# Patient Record
Sex: FEMALE | Race: WHITE | Employment: UNEMPLOYED | URBAN - METROPOLITAN AREA
[De-identification: names, ages, dates, MRNs, and addresses within clinical notes are randomized per-mention and may not be internally consistent; named-entity substitution may affect disease eponyms.]

---

## 2016-09-19 LAB
ANTIBODY SCREEN, EXTERNAL: NORMAL
CHLAMYDIA, EXTERNAL: NORMAL
GRBS, EXTERNAL: POSITIVE
HBSAG, EXTERNAL: NORMAL
HCT, EXTERNAL: 38.3
HGB, EXTERNAL: 12.9
HIV, EXTERNAL: NORMAL
N. GONORRHEA, EXTERNAL: NORMAL
PLATELET CNT,   EXTERNAL: 345
RPR, EXTERNAL: NORMAL
RUBELLA, EXTERNAL: NORMAL
TYPE, ABO & RH, EXTERNAL: NORMAL
URINALYSIS, EXTERNAL: NORMAL

## 2017-04-12 ENCOUNTER — HOSPITAL ENCOUNTER (INPATIENT)
Age: 32
LOS: 3 days | Discharge: HOME | End: 2017-04-15
Attending: OBSTETRICS & GYNECOLOGY | Admitting: OBSTETRICS & GYNECOLOGY
Payer: OTHER GOVERNMENT

## 2017-04-12 PROBLEM — O99.820 GROUP B STREPTOCOCCUS CARRIER STATE AFFECTING PREGNANCY: Status: ACTIVE | Noted: 2017-04-12

## 2017-04-12 PROBLEM — R63.8 INCREASED BMI: Status: ACTIVE | Noted: 2017-04-12

## 2017-04-12 PROCEDURE — 65270000029 HC RM PRIVATE

## 2017-04-12 PROCEDURE — 74011250636 HC RX REV CODE- 250/636: Performed by: ADVANCED PRACTICE MIDWIFE

## 2017-04-12 PROCEDURE — 85025 COMPLETE CBC W/AUTO DIFF WBC: CPT | Performed by: ADVANCED PRACTICE MIDWIFE

## 2017-04-12 PROCEDURE — 86900 BLOOD TYPING SEROLOGIC ABO: CPT | Performed by: ADVANCED PRACTICE MIDWIFE

## 2017-04-12 PROCEDURE — 74011000258 HC RX REV CODE- 258: Performed by: ADVANCED PRACTICE MIDWIFE

## 2017-04-12 RX ORDER — NALBUPHINE HYDROCHLORIDE 10 MG/ML
10 INJECTION, SOLUTION INTRAMUSCULAR; INTRAVENOUS; SUBCUTANEOUS
Status: DISCONTINUED | OUTPATIENT
Start: 2017-04-12 | End: 2017-04-13

## 2017-04-12 RX ORDER — METHYLERGONOVINE MALEATE 0.2 MG/ML
0.2 INJECTION INTRAVENOUS AS NEEDED
Status: DISCONTINUED | OUTPATIENT
Start: 2017-04-12 | End: 2017-04-13

## 2017-04-12 RX ORDER — HYDROMORPHONE HYDROCHLORIDE 1 MG/ML
1 INJECTION, SOLUTION INTRAMUSCULAR; INTRAVENOUS; SUBCUTANEOUS
Status: DISCONTINUED | OUTPATIENT
Start: 2017-04-12 | End: 2017-04-13

## 2017-04-12 RX ORDER — MISOPROSTOL 200 UG/1
800 TABLET ORAL
Status: DISCONTINUED | OUTPATIENT
Start: 2017-04-12 | End: 2017-04-13

## 2017-04-12 RX ORDER — OXYTOCIN/RINGER'S LACTATE 20/1000 ML
125 PLASTIC BAG, INJECTION (ML) INTRAVENOUS CONTINUOUS
Status: DISCONTINUED | OUTPATIENT
Start: 2017-04-12 | End: 2017-04-13

## 2017-04-12 RX ORDER — SODIUM CHLORIDE, SODIUM LACTATE, POTASSIUM CHLORIDE, CALCIUM CHLORIDE 600; 310; 30; 20 MG/100ML; MG/100ML; MG/100ML; MG/100ML
125 INJECTION, SOLUTION INTRAVENOUS CONTINUOUS
Status: DISCONTINUED | OUTPATIENT
Start: 2017-04-12 | End: 2017-04-13

## 2017-04-12 RX ORDER — BUTORPHANOL TARTRATE 1 MG/ML
1-2 INJECTION INTRAMUSCULAR; INTRAVENOUS
Status: DISCONTINUED | OUTPATIENT
Start: 2017-04-12 | End: 2017-04-13

## 2017-04-12 RX ORDER — TRISODIUM CITRATE DIHYDRATE AND CITRIC ACID MONOHYDRATE 500; 334 MG/5ML; MG/5ML
30 SOLUTION ORAL AS NEEDED
Status: DISCONTINUED | OUTPATIENT
Start: 2017-04-12 | End: 2017-04-13

## 2017-04-12 RX ORDER — ONDANSETRON 2 MG/ML
4 INJECTION INTRAMUSCULAR; INTRAVENOUS
Status: DISCONTINUED | OUTPATIENT
Start: 2017-04-12 | End: 2017-04-13

## 2017-04-12 RX ORDER — PENICILLIN G POTASSIUM 5000000 [IU]/1
INJECTION, POWDER, FOR SOLUTION INTRAMUSCULAR; INTRAVENOUS
Status: DISPENSED
Start: 2017-04-12 | End: 2017-04-13

## 2017-04-12 RX ORDER — ACETAMINOPHEN 650 MG/1
650 SUPPOSITORY RECTAL
Status: DISCONTINUED | OUTPATIENT
Start: 2017-04-12 | End: 2017-04-13

## 2017-04-12 RX ORDER — CASTOR OIL 100 %
60 OIL (ML) ORAL
Status: DISCONTINUED | OUTPATIENT
Start: 2017-04-12 | End: 2017-04-13

## 2017-04-12 RX ORDER — LIDOCAINE HYDROCHLORIDE 10 MG/ML
30 INJECTION, SOLUTION EPIDURAL; INFILTRATION; INTRACAUDAL; PERINEURAL AS NEEDED
Status: DISCONTINUED | OUTPATIENT
Start: 2017-04-12 | End: 2017-04-13

## 2017-04-12 RX ORDER — OXYTOCIN/RINGER'S LACTATE 20/1000 ML
500 PLASTIC BAG, INJECTION (ML) INTRAVENOUS ONCE
Status: DISPENSED | OUTPATIENT
Start: 2017-04-12 | End: 2017-04-13

## 2017-04-12 RX ORDER — SODIUM CHLORIDE 900 MG/100ML
INJECTION INTRAVENOUS
Status: DISPENSED
Start: 2017-04-12 | End: 2017-04-13

## 2017-04-12 RX ORDER — CARBOPROST TROMETHAMINE 250 UG/ML
250 INJECTION, SOLUTION INTRAMUSCULAR
Status: DISCONTINUED | OUTPATIENT
Start: 2017-04-12 | End: 2017-04-13

## 2017-04-12 RX ORDER — MAG HYDROX/ALUMINUM HYD/SIMETH 200-200-20
15 SUSPENSION, ORAL (FINAL DOSE FORM) ORAL
Status: DISCONTINUED | OUTPATIENT
Start: 2017-04-12 | End: 2017-04-13

## 2017-04-12 RX ORDER — TERBUTALINE SULFATE 1 MG/ML
0.25 INJECTION SUBCUTANEOUS
Status: DISCONTINUED | OUTPATIENT
Start: 2017-04-12 | End: 2017-04-13

## 2017-04-12 RX ADMIN — SODIUM CHLORIDE 5 MILLION UNITS: 900 INJECTION INTRAVENOUS at 21:56

## 2017-04-12 RX ADMIN — SODIUM CHLORIDE, SODIUM LACTATE, POTASSIUM CHLORIDE, AND CALCIUM CHLORIDE 125 ML/HR: 600; 310; 30; 20 INJECTION, SOLUTION INTRAVENOUS at 21:55

## 2017-04-12 NOTE — IP AVS SNAPSHOT
[address] Patient: Samaria Murillo MRN: [id number] YLJ:[birth year] You are allergic to the following No active allergies Immunizations Administered for This Admission Name Date Rho(D) Immune Globulin - IM  Deferred () Tdap  Deferred (),  Deferred () Recent Documentation Height Weight Breastfeeding? BMI OB Status Smoking Status 1.651 m 121.6 kg Yes 44.6 kg/m2 Recent pregnancy Never Smoker Emergency Contacts Name Discharge Info Relation Home Work Mobile Reji Gomez DISCHARGE CAREGIVER [3] Spouse [3]   [phone] About your hospitalization You were admitted on:  April 12, 2017 You last received care in the:  [place] [address] You were discharged on:  April 15, 2017 Unit phone number:  [phone] Why you were hospitalized Your primary diagnosis was:  Postpartum Care Following Vaginal Delivery Your diagnoses also included:  Labor And Delivery Indication For Care Or Intervention, Group B Streptococcus [place] Affecting Pregnancy, Increased Bmi Providers Seen During Your Hospitalizations Provider Role Specialty Primary office phone Bharathi Whitman MD Attending Provider Obstetrics & Gynecology [phone] Your Primary Care Physician (PCP) Primary Care Physician Office Phone Office Fax Andrea Pradhan [phone] Follow-up Information Follow up With Details Comments Contact Info Susan Hodges In 6 weeks  [address] [facility] [address] [facility] [address] [id number] [phone] Current Discharge Medication List  
  
START taking these medications Dose & Instructions Dispensing Information Comments Morning Noon Evening Bedtime  
 docusate sodium 100 mg capsule Commonly known as:  Edy Bello Your last dose was: Your next dose is:    
   
   
 Dose:  100 mg Take 1 Cap by mouth two (2) times daily as needed for Constipation. Quantity:  60 Cap Refills:  2  
     
   
   
   
  
 ibuprofen 800 mg tablet Commonly known as:  MOTRIN Your last dose was: Your next dose is:    
   
   
 Dose:  800 mg Take 1 Tab by mouth every six (6) hours as needed. Quantity:  60 Tab Refills:  0  
     
   
   
   
  
 iron polysaccharides 150 mg iron capsule Commonly known as:  Therisa Decatur Your last dose was: Your next dose is:    
   
   
 Dose:  150 mg Take 1 Cap by mouth daily. Quantity:  30 Cap Refills:  3 CONTINUE these medications which have NOT CHANGED Dose & Instructions Dispensing Information Comments Morning Noon Evening Bedtime PRENATAL PO Your last dose was: Your next dose is: Take  by mouth. Refills:  0 Where to Get Your Medications Information on where to get these meds will be given to you by the nurse or doctor. ! Ask your nurse or doctor about these medications  
  docusate sodium 100 mg capsule  
 ibuprofen 800 mg tablet  
 iron polysaccharides 150 mg iron capsule Discharge Instructions CONGRATULATIONS ON THE BIRTH OF YOUR BABY! The first six weeks after childbirth is a time of physical and emotional adjustment. This handout will help to answer questions and provide guidance during the postpartum period. Every family's adjustment is unique, so please call if you have further concerns. At anytime we can be reached at [phone]. During office hours please ask to speak to a charge nurse. After hours, the answering service will take a message and the Nurse-Midwife on-call will return your call. If your question can wait until office hours: Monday-Friday 8:30-4:00, please do so.   For emergencies or urgent concerns do not hesitate to call us after hours. DIET Your body is in need of a well-balanced, high protein diet to recuperate from birth. Please continue to take your prenatal vitamins for 6 weeks or as long as you are breastfeeding. Continue to drink at least 6-8 cups of water or other liquid a day. A breastfeeding mother also needs extra protein, calories and calcium containing foods. It is a good rule to drink fluids with every feeding in order to maintain an adequate milk supply and avoid dehydration. Your baby will probably not be bothered by things in your diet, but if the baby seems extremely fussy or develops a rash, you may want to discuss possible food intolerances with your baby's care provider. PAIN MEDICATIONS Acetaminophen (Tylenol), ibuprofen (Motrin), or other prescribed pain medication may be taken as directed to relieve discomfort. The above medications pass in very minimal amounts into the breast milk and usually will not cause problems. There are medications that may affect the baby, so please consult your baby's care provider before taking medication. If you are breastfeeding, be sure to mention this to any care provider you see so that medications that are safe may be selected. There is an excellent resource called [employer] that is a resource for medication safety in pregnancy and lactation. You can visit their website at [employer]. org/ or call them toll free at [phone] if you have any questions about medication safety. UTERINE INVOLUTION / VAGINAL BLEEDING Involution is the process of the uterus returning to pre-pregnant size. It will take approximately six weeks for this process to occur. To achieve this size your uterus becomes firm to slow bleeding loss from the placental site. The first 7 days after birth, the bleeding is red and heavy. It may change with your activity and position.  Some small clots are normal.   After ten days, the bleeding should be pale pink and slowed considerably. The next several weeks may progress to a pink, mucousy discharge. This may continue for 6-8 weeks, depending on your activity. During the first four weeks after delivery we recommend using sanitary pads instead of tampons. Douching should also be avoided, but it is fine to take a tub bath so long as the tub is very clean. ACTIVITY/EXERCISE Adequate rest is essential to recovery. Try to rest or sleep when the baby sleeps. After two weeks, you may begin going for short walks, doing Kegel exercises and abdominal crunches. Avoid heavy, jarring or aerobic exercises. Remember to start out slowly and build up to your previous fitness level. Use common sense and don't overdo as rest is important and the benefits of increased rest are a quicker recovery. For the first two weeks after a  try to limit trips up or down steps. Do not lift anything heavier than the baby during this time. Lifting the baby or other objects should be done by bending at the knees rather than the waist.  Driving should be avoided during the first two to three weeks until you have the strength to push firmly on the brakes in case of an emergency. You may ride as a passenger, but DO wear a seat belt at all times. After a few weeks, you may resume normal activity at whatever pace is comfortable for you. Exercise may also be resumed gradually. Walking is a good way to start. Finally, try to be reasonable in your expectations. Caring for a new baby after major surgery can be quite trying. Arrange for assistance at home to ensure that you get enough rest.  
 
POSTPARTUM CHECK You may call the office when you return home to set up a postpartum visit. Most patients will be seen at 6 weeks after delivery, but after a  or other circumstances you may be seen in 2 weeks or less.   If you are discharged from the hospital with staples that must be removed, you will be asked to come in sooner. At your postpartum visit, a pelvic exam may be performed. If you are having any problems or concerns, please do not hesitate to call. Once again our number is [phone]. MOOD CHANGES Significant hormonal changes occur in the days following delivery, and as a result, many women experience brief episodes of tearfulness or feeling \"blue. \"  These emotional swings may be made worse by lack of sleep and by the adjustments inherent in becoming a mother. For some women, these fluctuations are minor. For others, they are overwhelming; creating feelings of anxiety, depression, or the inability to cope. If you have difficulty functioning as a result of feeling down, or if the mood changes seem severe, do not improve, or result is thoughts of harming yourself or others CALL RIGHT AWAY. PERINEAL CARE The basic goals of perineal care are to prevent infection, to relieve pain and promote healing. Your stitches will dissolve in four to six weeks, and do not need to be removed. After urinating, please continue to clean with warm water from front to back. Please continue sitz baths as instructed twice a day for a week or as needed. Call the office if you see pus in the suture site, or have unusual or severe swelling or pain that seems to be getting worse. INCISION CARE If you had a , clean and dry the incision gently as you would the rest of your body. Washing over the area with soap and water, and showering are fine. If steri-strips are present they will gradually come off with time. Tub baths are permitted. You may experience numbness and burning in the area surrounding the incision which usually resolves gradually over the next several weeks or months. RETURN OF MENSTRUATION Your first menstrual period may occur as soon as four to six weeks after your delivery if you are not breast-feeding. If breast-feeding it is more difficult to predict when your first period will occur. Even if you are not yet menstruating, you may be ovulating and it may be possible to conceive again. It is common for your first period after childbirth to be very heavy with an increased amount of cramping. BREASTS Breast-feeding Mothers: Colostrum is excreted in the first 24-72 hours. Mature breast milk will appear on the 2nd to 5th day. Engorgement may occur with the mature milk making your breasts feel warm and very full. Frequent feedings will make you more comfortable. Babies do not nurse on regular schedules. Nursing every 1 1/2 to 2 hours is normal and frequent feeding DOES NOT mean you are not making enough milk. To avoid nipple confusion, do not give bottles for the first 4 weeks. Growth spurts are common and may require more frequent feedings. This is the way baby increases your milk supply. During a growth spurt, you may feel you are feeding very frequently and that your breasts are \"empty. \"  Don't worry, your milk is produced by supply and demand so this increased frequency of feeding will increase your milk supply within 48 hours. Sore nipples may occur with frequent feedings and are sometimes also caused by improper latch. Check for a proper latch. Baby should have a wide open mouth. Use different positions at each feeding if possible. Express a small amount of colostrum or breast milk onto the sore area and leave bra flaps unlatched until dry. The lactation consultant at [facility] is available for outpatient consultation without charge. Call [phone] from Monday-Friday 9:00am- 3:00pm to arrange an outpatient appointment with her. Local [facility] and consultants may also be very helpful. If You Are Not Breast-feeding: You will experience swelling, engorgement and some milk production.   There are no safe medications available to stop lactation. Some remedies for engorgement include: wearing a tight bra, ice packs and cold green cabbage leaves placed between the breast and your bra. Change these frequently. Tylenol or Motrin should help with the discomfort. SEXUAL ADJUSTMENTS We recommend that you wait at least four weeks before resuming sexual intercourse. A sore perineum, a demanding baby and fatigue will certainly affect your ability to enjoy lovemaking! A vaginal lubricant is recommended to help with any dryness. It is very important to remember that you will ovulate BEFORE your first period and can conceive. If you do not wish another pregnancy right away, please take precautions to avoid pregnancy. If you would like a prescription method of birth control, please discuss this with us at your 6 week visit. ELIMINATION We remind all postpartum patients that it may take a few days for your bowels to return to normal, especially if you had a long labor. For those who had C-sections or severe lacerations, we recommend that you use a stool softener twice daily for at least two weeks. Many stool softeners are over-the-counter. Colace (Docusate Sodium) is recommended. Bulk forming agents such as Metamucil or Fibercon may be used daily in addition to a stool softener to promote regular bowel movements. Eating fresh fruits and vegetables along with whole grains is helpful as well. Do not be afraid to have a bowel movement as your stitches will not \"come out\" in the course of having a bowel movement. Urination may be difficult due to soreness around the urethra, or as an after effect of epidural.  This is temporary and can be helped  by squirting water over the perineum or try going in the shower. Hemorrhoids are common after birth. Tucks pads, Anusol cream and avoiding constipation are helpful. If constipation does occur, you may take Milk of Magnesia or Senekot according to the package instructions. DANGER SIGNS! CALL WITHOUT DELAY IF YOU ARE EXPERIENCING ANY OF THE FOLLOWING: 
* Unusually heavy bleeding, soaking more than 1 or more pads in an hour. * Vaginal discharge with strong foul odor. * Fever of 101 or higher * Unusual pain or tenderness in the abdominal area. * If breasts are red, hot or have a painful lump. * Depression that persists longer than 1-2 weeks or is severe. * Any urinary frequency accompanied by urgency or pain. * A lump in leg or calf especially if painful, warm or red. We thank you for choosing us for your prenatal care and/or delivery. We wish you all happiness and health with your baby for his or her lifetime! Discharge Instructions Attachments/References SAFE SLEEP AND SUDDEN INFANT DEATH SYNDROME (SIDS): PEDIATRIC: GENERAL INFO (ENGLISH) SHAKEN BABY SYNDROME: PEDIATRIC (ENGLISH) DEPRESSION: POSTPARTUM (ENGLISH) PARENTING: STRESS AND INFANTS (ENGLISH) Discharge Orders None [facility]ounce[facility] We are excited to announce that we are making your provider's discharge notes available to you in [employer]. You will see these notes when they are completed and signed by the physician that discharged you from your recent hospital stay. If you have any questions or concerns about any information you see in [employer], please call the Health Information Department where you were seen or reach out to your Primary Care Provider for more information about your plan of care. Introducing [facility]! [facility] introduces [employer] patient portal. Now you can access parts of your medical record, email your doctor's office, and request medication refills online. 1. In your internet browser, go to [email or web address] 2. Click on the First Time User? Click Here link in the Sign In box. You will see the New Member Sign Up page. 3. Enter your [employer] Access Code exactly as it appears below.  You will not need to use this code after youve completed the sign-up process. If you do not sign up before the expiration date, you must request a new code. · [employer] Access Code: [email or web address] Expires: 6/29/2017  1:41 PM 
 
4. Enter the last four digits of your Social Security Number (xxxx) and Date of Birth (mm/dd/yyyy) as indicated and click Submit. You will be taken to the next sign-up page. 5. Create a [employer] ID. This will be your [employer] login ID and cannot be changed, so think of one that is secure and easy to remember. 6. Create a [employer] password. You can change your password at any time. 7. Enter your Password Reset Question and Answer. This can be used at a later time if you forget your password. 8. Enter your e-mail address. You will receive e-mail notification when new information is available in [address] 9. Click Sign Up. You can now view and download portions of your medical record. 10. Click the Download Summary menu link to download a portable copy of your medical information. If you have questions, please visit the Frequently Asked Questions section of the [employer] website. Remember, [employer] is NOT to be used for urgent needs. For medical emergencies, dial 911. Now available from your iPhone and Android! General Information Please provide this summary of care documentation to your next provider. Patient Signature:  ____________________________________________________________ Date:  ____________________________________________________________  
  
Mecca Shane Provider Signature:  ____________________________________________________________ Date:  ____________________________________________________________ More Information Learning About Safe Sleep for Babies Why is safe sleep important?  
Enjoy your time with your baby, and know that you can do a few things to keep your baby safe. Following safe sleep guidelines can help prevent sudden infant death syndrome (SIDS) and reduce other sleep-related risks. SIDS is the death of a baby younger than 1 year with no known cause. Talk about these safety steps with your  providers, family, friends, and anyone else who spends time with your baby. Explain in detail what you expect them to do. Do not assume that people who care for your baby know these guidelines. What are the tips for safe sleep? Putting your baby to sleep · Put your baby to sleep on his or her back, not on the side or tummy. This reduces the risk of SIDS. · Once your baby learns to roll from the back to the belly, you do not need to keep shifting your baby onto his or her back. But keep putting your baby down to sleep on his or her back. · Keep the room at a comfortable temperature so that your baby can sleep in lightweight clothes without a blanket. Usually, the temperature is about right if an adult can wear a long-sleeved T-shirt and pants without feeling cold. Make sure that your baby doesn't get too warm. Your baby is likely too warm if he or she sweats or tosses and turns a lot. · Consider offering your baby a pacifier at nap time and bedtime if your doctor agrees. · The American Academy of Pediatrics recommends that you do not sleep with your baby in the bed with you. · When your baby is awake and someone is watching, allow your baby to spend some time on his or her belly. This helps your baby get strong and may help prevent flat spots on the back of the head. Cribs, cradles, bassinets, and bedding · For the first 6 months, have your baby sleep in a crib, cradle, or bassinet in the same room where you sleep. · Keep soft items and loose bedding out of the crib. Items such as blankets, stuffed animals, toys, and pillows could block your baby's mouth or trap your baby. Dress your baby in sleepers instead of using blankets. · Make sure that your baby's crib has a firm mattress (with a fitted sheet). Don't use bumper pads or other products that attach to crib slats or sides. They could block your baby's mouth or trap your baby. · Do not place your baby in a car seat, sling, swing, bouncer, or stroller to sleep. The safest place for a baby is in a crib, cradle, or bassinet that meets safety standards. What else is important to know? More about sudden infant death syndrome (SIDS) SIDS is very rare. In most cases, a parent or other caregiver puts the babywho seems healthydown to sleep and returns later to find that the baby has . No one is at fault when a baby dies of SIDS. A SIDS death cannot be predicted, and in many cases it cannot be prevented. Doctors do not know what causes SIDS. It seems to happen more often in premature and low-birth-weight babies. It also is seen more often in babies whose mothers did not get medical care during the pregnancy and in babies whose mothers smoke. Do not smoke or let anyone else smoke in the house or around your baby. Exposure to smoke increases the risk of SIDS. If you need help quitting, talk to your doctor about stop-smoking programs and medicines. These can increase your chances of quitting for good. Breastfeeding your child may help prevent SIDS. Be wary of products that are billed as helping prevent SIDS. Talk to your doctor before buying any product that claims to reduce SIDS risk. What to do while still pregnant · See your doctor regularly. Women who see a doctor early in and throughout their pregnancies are less likely to have babies who die of SIDS. · Eat a healthy, balanced diet, which can help prevent a premature baby or a baby with a low birth weight. · Do not smoke or let anyone else smoke in the house or around you. Smoking or exposure to smoke during pregnancy increases the risk of SIDS.  If you need help quitting, talk to your doctor about stop-smoking programs and medicines. These can increase your chances of quitting for good. · Do not drink alcohol or take illegal drugs. Alcohol or drug use may cause your baby to be born early. Follow-up care is a key part of your child's treatment and safety. Be sure to make and go to all appointments, and call your doctor if your child is having problems. It's also a good idea to know your child's test results and keep a list of the medicines your child takes. Where can you learn more? Go to http://brooklynn-sebastien.info/. Enter D744 in the search box to learn more about \"Learning About Safe Sleep for Babies. \" Current as of: July 26, 2016 Content Version: 11.2 © 7151-4382 [employer]. Care instructions adapted under license by [employer] (which disclaims liability or warranty for this information). If you have questions about a medical condition or this instruction, always ask your healthcare professional. [place] [address] any warranty or liability for your use of this information. Shaken Baby Syndrome: Care Instructions Your Care Instructions If you want to save this information but don't think it is safe to take it home, see if a trusted friend can keep it for you. Plan ahead. Know who you can call for help, and memorize the phone number. Be careful online too. Your online activity may be seen by others. Do not use your personal computer or device to read about this topic. Use a safe computer such as one at work, a friend's house, or a "[employer] 19. There is a big difference between normal play activities and violent movements that harm a child. Bouncing a child on a knee or gently tossing a child in the air does not cause shaken baby syndrome. Shaken baby syndrome is brain damage that occurs when a baby is shaken or is slammed or thrown against an object.  It is a form of child abuse that occurs when the baby's caregiver loses control. Shaking a baby or striking a baby's head can cause bruising and bleeding to the brain. Caring for a baby can be trying at times. You may have periods of feeling overwhelmed, especially if your baby is crying. Many babies cry from 1 to 5 hours out of every 24 hours during the first few months of life. Some babies cry more. You can learn ways to help stay in control of your emotions when you feel stressed. Then you can be with your baby in a loving and healthy way. Follow-up care is a key part of your child's treatment and safety. Be sure to make and go to all appointments, and call your doctor if your child is having problems. It's also a good idea to know your child's test results and keep a list of the medicines your child takes. How can you care for your child at home? · Take steps to protect yourself from being stressed. ¨ Learn about how children develop so that you will understand why your child behaves as he or she does. Talk to your doctor about parent education classes or books. ¨ Talk with other parents about the ways they cope with the demands of parenting. ¨ Ask for help when you need time for yourself. ¨ Take short breaks and naps whenever you can. · If your baby cries a lot, try these ways to take care of his or her needs or to remove yourself safely. ¨ Check to see if your baby is hungry or has a dirty diaper. ¨ Hold your baby to your chest while you take and release deep breaths. ¨ Swing, rock, or walk with your baby. Some babies love to be taken for car rides or stroller walks. ¨ Tell stories and sing songs to your baby, who loves to hear your voice. ¨ Let your baby cry alone for a few minutes if his or her needs are taken care of and he or she is in a safe place, such as a crib. Remove yourself to another room where you can breathe calmly and try to clear your head. Count to 10 with each breath. ¨ Talk to your doctor if your baby continues to cry for what seems to be no reason. · Try some steps for relieving stress in your life. There are self-help books and classes on yoga, relaxation techniques, and other ways to relieve stress. Counseling and anger management training help many parents adjust to new pressures. · Never shake a baby. Never slap or hit a baby. · Take steps to protect your child from abuse by others. ¨ Screen your potential  providers to find out their backgrounds and attitudes about . ¨ If you suspect child abuse and the child is not in immediate danger, contact your local child protection services or police. ¨ Do not confront someone who you suspect is a child abuser. This may cause more harm to the child. ¨ If you are concerned about a child's well-being, call the [facility] hotline at 5-292-0-A-CHILD (2-130.311.3430). When should you call for help? Call 911 anytime you think a child may need emergency care. For example, call if: · A child is unconscious or is having trouble breathing. · A baby has been shaken. It is extremely important that a shaken baby gets medical care right away. Call your doctor now or seek immediate medical care if: 
· You are concerned that you cannot control your actions around your child. · You are concerned that a child's caregiver cannot control his or her actions around a child. Watch closely for changes in your child's health, and be sure to contact your doctor if your child has any problems. Where can you learn more? Go to http://brooklynn-sebastien.info/. Enter H891 in the search box to learn more about \"Shaken Baby Syndrome: Care Instructions. \" Current as of: July 26, 2016 Content Version: 11.2 © 9023-2908 [employer].  Care instructions adapted under license by [employer] (which disclaims liability or warranty for this information). If you have questions about a medical condition or this instruction, always ask your healthcare professional. [address] any warranty or liability for your use of this information. Depression After Childbirth: Care Instructions Your Care Instructions Many women get the \"baby blues\" during the first few days after childbirth. You may lose sleep, feel irritable, and cry easily. You may feel happy one minute and sad the next. Hormone changes are one cause of these emotional changes. Also, the demands of a new baby, along with visits from relatives or other family needs, add to a mother's stress. The \"baby blues\" often peak around the fourth day. Then they ease up in less than 2 weeks. If your moodiness or anxiety lasts for more than 2 weeks, or if you feel like life is not worth living, you may have postpartum depression. This is different for each mother. Some mothers with serious depression may worry intensely about their infant's well-being. Others may feel distant from their child. Some mothers might even feel that they might harm their baby. A mother may have signs of paranoia, wondering if someone is watching her. Depression is not a sign of weakness. It is a medical condition that requires treatment. Medicine and counseling often work well to reduce depression. Talk to your doctor about taking antidepressant medicine while breastfeeding. Follow-up care is a key part of your treatment and safety. Be sure to make and go to all appointments, and call your doctor if you are having problems. It's also a good idea to know your test results and keep a list of the medicines you take. How do you know if you are depressed? With all the changes in your life, you may not know if you are depressed. Pregnancy sometimes causes changes in how you feel that are similar to the symptoms of depression. Symptoms of depression include: · Feeling sad or hopeless and losing interest in daily activities. These are the most common symptoms of depression. · Sleeping too much or not enough. · Feeling tired. You may feel as if you have no energy. · Eating too much or too little. · Writing or talking about death, such as writing suicide notes or talking about guns, knives, or pills. Keep the numbers for these national suicide hotlines: 2-307-893-TALK (7-616.316.5441) and 3-927-MLNOKVL (9-907.334.6295). If you or someone you know talks about suicide or feeling hopeless, get help right away. How can you care for yourself at home? · Be safe with medicines. Take your medicines exactly as prescribed. Call your doctor if you think you are having a problem with your medicine. · Eat a healthy diet so that you can keep up your energy. · Get regular daily exercise, such as walks, to help improve your mood. · Get as much sunlight as possible. Keep your shades and curtains open. Get outside as much as you can. · Avoid using alcohol or other substances to feel better. · Get as much rest and sleep as possible. Avoid doing too much. Being too tired can increase depression. · Play stimulating music throughout your day and soothing music at night. · Schedule outings and visits with friends and family. Ask them to call you regularly, so that you do not feel alone. · Ask for help with preparing food and other daily tasks. Family and friends are often happy to help a mother with a . · Be honest with yourself and those who care about you. Tell them about your struggle. · Join a support group of new mothers. No one can better understand the challenges of caring for a  than other new mothers. · If you feel like life is not worth living or are feeling hopeless, get help right away. Keep the numbers for these national suicide hotlines: 3-483-687-TALK (1-243.962.7954) and 8-150-BDNOTNF (2-728.144.5121). When should you call for help? Call 911 anytime you think you may need emergency care. For example, call if: 
· You feel you cannot stop from hurting yourself, your baby, or someone else. Call your doctor now or seek immediate medical care if: 
· You are having trouble caring for yourself or your baby. · You hear voices. Watch closely for changes in your health, and be sure to contact your doctor if: 
· You have problems with your depression medicine. · You do not get better as expected. Where can you learn more? Go to http://brooklynn-sebastien.info/. Enter B549 in the search box to learn more about \"Depression After Childbirth: Care Instructions. \" Current as of: July 26, 2016 Content Version: 11.2 © 4819-8578 [employer]. Care instructions adapted under license by [employer] (which disclaims liability or warranty for this information). If you have questions about a medical condition or this instruction, always ask your healthcare professional. [place] [address] any warranty or liability for your use of this information. Stress in Parents of Infants: Care Instructions Your Care Instructions Meeting the increased demands of being a new parent can be a big challenge. It is easy to get overtired and overwhelmed during the first weeks. What used to be a simple chore, such as buying groceries, is not so simple now. Plus, you have new chores, including feeding and changing your new baby. At the end of the day, you may be so tired that you feel like crying. Instead of looking forward to the next day, you may be dreading tomorrow. Like many new parents, you are burned out from the stress of having a new baby. Stress affects each of us differently, and the most effective ways to relieve it are different for each person. You can try different methods to find out which ones work best for you.  As the weeks go by, you will begin to develop a rhythm with your baby. Tasks that now seem to take forever will become easier. Many women get the \"baby blues\" during the first few days after childbirth. If you are a new mother and the \"baby blues\" last more than a few days, call your doctor right away. Depression is a medical condition that requires treatment. Follow-up care is a key part of your treatment and safety. Be sure to make and go to all appointments, and call your doctor if you are having problems. It's also a good idea to know your test results and keep a list of the medicines you take. How can you care for yourself at home? · Be kind to yourself. Your new baby takes a lot of work, but he or she can give you a lot of pleasure too. Do not worry about housekeeping for a while. · Allow your friends to bring you meals or do chores. · Limit visitors to as few as you feel you can handle, or ask them not to visit for a while. Before they come, set a limit on how long they will stay. · Sleep when your baby sleeps. Even a short nap helps. · Find what triggers your stress, and avoid those things as much as you can. · If you breastfeed, learn how to collect and store some breast milk so your partner or  can feed the baby while you sleep. · Eat a balanced diet so you can keep up your energy. · Drink plenty of fluids throughout the day. · Avoid caffeine and alcohol. Caffeine is found in coffee, tea, cola drinks, chocolate, and other foods. · Limit medicines that can make you more tired, such as tranquilizers and cold and allergy medicines. · Get regular daily exercise, such as walks, to help improve your mood. Rest after you exercise. · Be honest with yourself and those who care about you. Tell them you are stressed and tired. · Talking to other new parents can help. Ask your doctor or child's doctor to suggest support groups for new parents. Hearing that someone else is having the same experiences you are can help a lot. · If you have the baby blues for more than a few days, call your doctor right away. When should you call for help? Call 911 anytime you think you may need emergency care. For example, call if: 
· You have thoughts of hurting yourself, your baby, or another person. Call your doctor now or seek immediate medical care if: 
· You are having trouble caring for yourself or your baby. Watch closely for changes in your health, and be sure to contact your doctor if you have any problems. Where can you learn more? Go to http://brooklynn-sebastien.info/. Enter H142 in the search box to learn more about \"Stress in Parents of Infants: Care Instructions. \" Current as of: July 26, 2016 Content Version: 11.2 © 9749-5653 [employer]. Care instructions adapted under license by [employer] (which disclaims liability or warranty for this information). If you have questions about a medical condition or this instruction, always ask your healthcare professional. [address] any warranty or liability for your use of this information.

## 2017-04-13 ENCOUNTER — ANESTHESIA (OUTPATIENT)
Dept: LABOR AND DELIVERY | Age: 32
End: 2017-04-13
Payer: OTHER GOVERNMENT

## 2017-04-13 ENCOUNTER — ANESTHESIA EVENT (OUTPATIENT)
Dept: LABOR AND DELIVERY | Age: 32
End: 2017-04-13
Payer: OTHER GOVERNMENT

## 2017-04-13 LAB
ABO + RH BLD: NORMAL
BASOPHILS # BLD AUTO: 0 K/UL (ref 0–0.06)
BASOPHILS # BLD: 0 % (ref 0–2)
BLOOD GROUP ANTIBODIES SERPL: NORMAL
DIFFERENTIAL METHOD BLD: ABNORMAL
EOSINOPHIL # BLD: 0.1 K/UL (ref 0–0.4)
EOSINOPHIL NFR BLD: 1 % (ref 0–5)
ERYTHROCYTE [DISTWIDTH] IN BLOOD BY AUTOMATED COUNT: 13.1 % (ref 11.6–14.5)
HCT VFR BLD AUTO: 33.1 % (ref 35–45)
HGB BLD-MCNC: 10.9 G/DL (ref 12–16)
LYMPHOCYTES # BLD AUTO: 14 % (ref 21–52)
LYMPHOCYTES # BLD: 2 K/UL (ref 0.9–3.6)
MCH RBC QN AUTO: 30.1 PG (ref 24–34)
MCHC RBC AUTO-ENTMCNC: 32.9 G/DL (ref 31–37)
MCV RBC AUTO: 91.4 FL (ref 74–97)
MONOCYTES # BLD: 1.2 K/UL (ref 0.05–1.2)
MONOCYTES NFR BLD AUTO: 9 % (ref 3–10)
NEUTS SEG # BLD: 10.9 K/UL (ref 1.8–8)
NEUTS SEG NFR BLD AUTO: 76 % (ref 40–73)
PLATELET # BLD AUTO: 352 K/UL (ref 135–420)
PMV BLD AUTO: 10.6 FL (ref 9.2–11.8)
RBC # BLD AUTO: 3.62 M/UL (ref 4.2–5.3)
SPECIMEN EXP DATE BLD: NORMAL
WBC # BLD AUTO: 14.1 K/UL (ref 4.6–13.2)

## 2017-04-13 PROCEDURE — 77030007879 HC KT SPN EPDRL TELE -B: Performed by: ANESTHESIOLOGY

## 2017-04-13 PROCEDURE — 74011250636 HC RX REV CODE- 250/636

## 2017-04-13 PROCEDURE — 75410000002 HC LABOR FEE PER 1 HR

## 2017-04-13 PROCEDURE — 65270000029 HC RM PRIVATE

## 2017-04-13 PROCEDURE — 00HU33Z INSERTION OF INFUSION DEVICE INTO SPINAL CANAL, PERCUTANEOUS APPROACH: ICD-10-PCS | Performed by: ANESTHESIOLOGY

## 2017-04-13 PROCEDURE — 76060000078 HC EPIDURAL ANESTHESIA

## 2017-04-13 PROCEDURE — 74011250636 HC RX REV CODE- 250/636: Performed by: ADVANCED PRACTICE MIDWIFE

## 2017-04-13 PROCEDURE — 74011250636 HC RX REV CODE- 250/636: Performed by: ANESTHESIOLOGY

## 2017-04-13 PROCEDURE — 77030020255 HC SOL INJ LR 1000ML BG

## 2017-04-13 PROCEDURE — 74011000250 HC RX REV CODE- 250

## 2017-04-13 PROCEDURE — 75410000003 HC RECOV DEL/VAG/CSECN EA 0.5 HR

## 2017-04-13 PROCEDURE — 77030034849

## 2017-04-13 PROCEDURE — 74011250637 HC RX REV CODE- 250/637: Performed by: ADVANCED PRACTICE MIDWIFE

## 2017-04-13 PROCEDURE — 75410000000 HC DELIVERY VAGINAL/SINGLE

## 2017-04-13 RX ORDER — FENTANYL CITRATE 50 UG/ML
INJECTION, SOLUTION INTRAMUSCULAR; INTRAVENOUS AS NEEDED
Status: DISCONTINUED | OUTPATIENT
Start: 2017-04-13 | End: 2017-04-13 | Stop reason: HOSPADM

## 2017-04-13 RX ORDER — ZOLPIDEM TARTRATE 5 MG/1
5 TABLET ORAL
Status: DISCONTINUED | OUTPATIENT
Start: 2017-04-13 | End: 2017-04-15 | Stop reason: HOSPADM

## 2017-04-13 RX ORDER — PROMETHAZINE HYDROCHLORIDE 25 MG/ML
25 INJECTION, SOLUTION INTRAMUSCULAR; INTRAVENOUS
Status: DISCONTINUED | OUTPATIENT
Start: 2017-04-13 | End: 2017-04-15 | Stop reason: HOSPADM

## 2017-04-13 RX ORDER — FENTANYL CITRATE 50 UG/ML
100 INJECTION, SOLUTION INTRAMUSCULAR; INTRAVENOUS ONCE
Status: COMPLETED | OUTPATIENT
Start: 2017-04-13 | End: 2017-04-13

## 2017-04-13 RX ORDER — ACETAMINOPHEN 325 MG/1
650 TABLET ORAL
Status: DISCONTINUED | OUTPATIENT
Start: 2017-04-13 | End: 2017-04-15 | Stop reason: HOSPADM

## 2017-04-13 RX ORDER — FENTANYL CITRATE 50 UG/ML
INJECTION, SOLUTION INTRAMUSCULAR; INTRAVENOUS
Status: COMPLETED
Start: 2017-04-13 | End: 2017-04-13

## 2017-04-13 RX ORDER — DIPHENHYDRAMINE HYDROCHLORIDE 50 MG/ML
25 INJECTION, SOLUTION INTRAMUSCULAR; INTRAVENOUS
Status: DISCONTINUED | OUTPATIENT
Start: 2017-04-13 | End: 2017-04-13

## 2017-04-13 RX ORDER — HYDROCORTISONE 25 MG/G
CREAM TOPICAL AS NEEDED
Status: DISCONTINUED | OUTPATIENT
Start: 2017-04-13 | End: 2017-04-15 | Stop reason: HOSPADM

## 2017-04-13 RX ORDER — OXYTOCIN IN 5 % DEXTROSE 30/500 ML
.5-2 PLASTIC BAG, INJECTION (ML) INTRAVENOUS
Status: DISCONTINUED | OUTPATIENT
Start: 2017-04-13 | End: 2017-04-13

## 2017-04-13 RX ORDER — OXYTOCIN IN 5 % DEXTROSE 30/500 ML
PLASTIC BAG, INJECTION (ML) INTRAVENOUS
Status: COMPLETED
Start: 2017-04-13 | End: 2017-04-13

## 2017-04-13 RX ORDER — NALOXONE HYDROCHLORIDE 0.4 MG/ML
0.2 INJECTION, SOLUTION INTRAMUSCULAR; INTRAVENOUS; SUBCUTANEOUS AS NEEDED
Status: DISCONTINUED | OUTPATIENT
Start: 2017-04-13 | End: 2017-04-13

## 2017-04-13 RX ORDER — OXYCODONE AND ACETAMINOPHEN 5; 325 MG/1; MG/1
1-2 TABLET ORAL
Status: DISCONTINUED | OUTPATIENT
Start: 2017-04-13 | End: 2017-04-15 | Stop reason: HOSPADM

## 2017-04-13 RX ORDER — LIDOCAINE HYDROCHLORIDE AND EPINEPHRINE 15; 5 MG/ML; UG/ML
INJECTION, SOLUTION EPIDURAL AS NEEDED
Status: DISCONTINUED | OUTPATIENT
Start: 2017-04-13 | End: 2017-04-13 | Stop reason: HOSPADM

## 2017-04-13 RX ORDER — IBUPROFEN 400 MG/1
800 TABLET ORAL
Status: DISCONTINUED | OUTPATIENT
Start: 2017-04-13 | End: 2017-04-15 | Stop reason: HOSPADM

## 2017-04-13 RX ORDER — AMOXICILLIN 250 MG
1 CAPSULE ORAL
Status: DISCONTINUED | OUTPATIENT
Start: 2017-04-13 | End: 2017-04-15 | Stop reason: HOSPADM

## 2017-04-13 RX ADMIN — FENTANYL CITRATE 100 MCG: 50 INJECTION, SOLUTION INTRAMUSCULAR; INTRAVENOUS at 12:19

## 2017-04-13 RX ADMIN — Medication 2 MILLI-UNITS: at 07:35

## 2017-04-13 RX ADMIN — NALBUPHINE HYDROCHLORIDE 10 MG: 10 INJECTION, SOLUTION INTRAMUSCULAR; INTRAVENOUS; SUBCUTANEOUS at 11:51

## 2017-04-13 RX ADMIN — PENICILLIN G POTASSIUM 2.5 MILLION UNITS: 20000000 POWDER, FOR SOLUTION INTRAVENOUS at 14:02

## 2017-04-13 RX ADMIN — LIDOCAINE HYDROCHLORIDE AND EPINEPHRINE 3 ML: 15; 5 INJECTION, SOLUTION EPIDURAL at 13:16

## 2017-04-13 RX ADMIN — PENICILLIN G POTASSIUM 2.5 MILLION UNITS: 20000000 POWDER, FOR SOLUTION INTRAVENOUS at 02:04

## 2017-04-13 RX ADMIN — SODIUM CHLORIDE, SODIUM LACTATE, POTASSIUM CHLORIDE, AND CALCIUM CHLORIDE 1000 ML: 600; 310; 30; 20 INJECTION, SOLUTION INTRAVENOUS at 12:09

## 2017-04-13 RX ADMIN — Medication 999 ML/HR: at 15:55

## 2017-04-13 RX ADMIN — LIDOCAINE HYDROCHLORIDE AND EPINEPHRINE 2 ML: 15; 5 INJECTION, SOLUTION EPIDURAL at 13:18

## 2017-04-13 RX ADMIN — PENICILLIN G POTASSIUM 2.5 MILLION UNITS: 20000000 POWDER, FOR SOLUTION INTRAVENOUS at 10:05

## 2017-04-13 RX ADMIN — SODIUM CHLORIDE, SODIUM LACTATE, POTASSIUM CHLORIDE, AND CALCIUM CHLORIDE 125 ML/HR: 600; 310; 30; 20 INJECTION, SOLUTION INTRAVENOUS at 02:04

## 2017-04-13 RX ADMIN — PENICILLIN G POTASSIUM 2.5 MILLION UNITS: 20000000 POWDER, FOR SOLUTION INTRAVENOUS at 05:59

## 2017-04-13 RX ADMIN — IBUPROFEN 800 MG: 400 TABLET, FILM COATED ORAL at 20:56

## 2017-04-13 RX ADMIN — LIDOCAINE HYDROCHLORIDE AND EPINEPHRINE 2 ML: 15; 5 INJECTION, SOLUTION EPIDURAL at 12:19

## 2017-04-13 RX ADMIN — LIDOCAINE HYDROCHLORIDE AND EPINEPHRINE 3 ML: 15; 5 INJECTION, SOLUTION EPIDURAL at 12:17

## 2017-04-13 RX ADMIN — Medication 10 ML/HR: at 12:28

## 2017-04-13 RX ADMIN — SODIUM CHLORIDE, SODIUM LACTATE, POTASSIUM CHLORIDE, AND CALCIUM CHLORIDE 125 ML/HR: 600; 310; 30; 20 INJECTION, SOLUTION INTRAVENOUS at 13:11

## 2017-04-13 NOTE — ANESTHESIA PREPROCEDURE EVALUATION
Anesthetic History   No history of anesthetic complications            Review of Systems / Medical History  Patient summary reviewed and pertinent labs reviewed    Pulmonary  Within defined limits                 Neuro/Psych   Within defined limits           Cardiovascular  Within defined limits                Exercise tolerance: >4 METS     GI/Hepatic/Renal  Within defined limits              Endo/Other        Morbid obesity     Other Findings   Comments:   Risk Factors for Postoperative nausea/vomiting:       History of postoperative nausea/vomiting? NO       Female? YES       Motion sickness? NO       Intended opioid administration for postoperative analgesia?   YES           Physical Exam    Airway  Mallampati: II  TM Distance: 4 - 6 cm  Neck ROM: normal range of motion   Mouth opening: Normal     Cardiovascular  Regular rate and rhythm,  S1 and S2 normal,  no murmur, click, rub, or gallop  Rhythm: regular  Rate: normal         Dental  No notable dental hx       Pulmonary  Breath sounds clear to auscultation               Abdominal  GI exam deferred       Other Findings            Anesthetic Plan    ASA: 3  Anesthesia type: general and epidural          Induction: Intravenous  Anesthetic plan and risks discussed with: Patient

## 2017-04-13 NOTE — PROGRESS NOTES
OB Progress Note    S: Pt reports minimal discomfort with ctx. O: Patient Vitals for the past 4 hrs:   Temp Pulse BP   17 0603 98.2 °F (36.8 °C) 80 119/81            VE: not checked       TOCO: q 3 minutes       FHT: Category 1         A/P: IUP at  [id number], Early labor without  Progression - now on pitocin. Expect  today.                       Amelia Hannon MD  2017

## 2017-04-13 NOTE — ROUTINE PROCESS
0710 Received report. Assumed care of pt.  0730 Assessment complete. 7923 Started pitocin at 2 milliunit's/min. 9299 Increased pitocin to 4 milliunit's/min.  0902 Increased pitocin to 6 milliunit's/min. Turned pt to exaggerated right lateral position. Right leg remains straight on bed. Left leg bent up toward chest and propped on peanut-shaped birthing ball. EFM readjusted. Spouse at bedside. Pt tolerated position change well. 1000 Pt called this RN to room for SROM. Large amount of slightly green-tinged fluid noted. After ROM pt was grunting through contraction and felt \"a little pushy. \" SVE done per this RN with results of 6/50/-3. Pt and spouse informed of results. Pericare done. Turned pt to exaggerated left lateral position. Left leg remains straight on bed. Right leg bent up toward chest and propped on peanut-shaped birthing ball. Spouse remains at bedside. Room set up and ready for delivery. 1005 Scheduled dose of penicillin started-see MAR. This RN attempting to adjust TOCO to monitor contractions but unable to place TOCO in a spot where contractions are monitored. Pt and spouse encouraged to notify this RN for increased vaginal or rectal pressure or urge to push. Pt and spouse verbalized understanding. [address] notified [employer] (nursery transition nurse) of slight meconium-tinged fluid with ROM. 80 Pt spouse out to get this RN for pt \"feeling pushy. \" SVE done per this RN with results of 6/50/ballotable. Pt and spouse informed of results. Talked with pt and spouse about needing to confirm presenting part by ultrasound due to station being so different from previous exams. Also explained to pt to not push due to not being completely dilated and there is a risk of cervical tear. Pt verbalized understanding. Will go speak with Trupti Arellano CNM.   36 Talked with Trupti Arellano CNM about assessment findings of 6/50/ballotable, SROM at 1000, pitocin at 10 milliunit's/min, and pt feeling echo. Requested ultrasound to confirm presentation. Malia to make a phone call then will come assess pt. Pt and spouse aware of plan. 108 [facility] in room to assess pt. Vertex confirmed per ultrasound. SVE done per Malia-see notes. Pt not coping well with contractions at this time. Plan to give Nubain IV and get pt an epidural. Pt aware and agrees with plan. 1151 Nubain given-see MAR. Pitocin turned down to 5 milliunit's/min due to pt not coping well with contractions at this time. [address] that pitocin was decreased to 5 milliunit's/min due to pt not coping with contractions. Malia ok with stopping pitocin at this time. Pitocin turned off.  1159 Malia talked with Dr. Terrie Humphrey about above stated assessments. Dr. Terrie Humphrey to come assess pt around 1300 after pt is comfortable. Pt informed and agrees with plan. 80 Dr. Tiffanie Gil from anesthesia in room to place epidural.  1207 Assisted pt to dangle on side of bed for epidural.  1212 Time out done prior to epidural.  1217 Test dose given. 1225 Epidural placement complete. Assisted pt to lying position with left tilt. EFM readjusted. 200 Pt states \"I was in hell earlier but I think this is better. \" Will continue to monitor. 1235 Youngblood catheter inserted using sterile technique with immediate return of clear yellow urine. Pt tolerated procedure well. [phone] [id number] Dr. Terrie Humphrey here to assess pt. Pt moaning with contractions and not coping anymore. Braden Castillo to call anesthesia to come reassess. SVE done per Dr. Terrie Humphrey with results of 6/80/-1. Pt and spouse informed of results. 1 Dr. Tiffanie Gil from anesthesia here to replace epidural. Assisted pt to dangle on side of bed.  1310 Epidural catheter removed per Dr. Tiffanie Gil. Catheter tip intact. 1312 Time out done prior to epidural.  1316 Test dose done. 1320 Epidural placement complete. Assisted pt to lying position with left tilt. 1332 Pt complaining of increased vaginal pressure.  SVE done per this RN with results of 7./-1. Pt and spouse informed of results. Room set up and ready for delivery. 1357 Pt denies pain with contractions. Epidural effective. Pitocin restarted at 2 milliunit's/min. 1400 PCN started as ordered-see MAR.  1432 Increased pitocin to 4 milliunit's/min. 1514 Increased pitocin to 6 milliunit's/min. Pt denies pain with contractions. Spouse remains at bedside. [phone] Pt spouse out to tell this RN that pt is having increased urge to push. SVE done with results of /+1. Pt and spouse informed of results. Will notify [facility] CNM. 1535 Update given to Malia. 46 Pt spouse back out to desk stating \"she can't stop pushing. \" This RN and Pieter Douglas CNM in room to assess pt. SVE done per Malia with results of 10/100/+2. Pt and spouse aware of results. Called for nursery transition nurse to come to room for delivery. Dr. Kojo Machado (pediatrician) also informed to come to room due to slight green-tinged fluid noted this morning. Youngblood catheter removed (300 ml urine emptied). Foot of bed removed for delivery. 5  of viable female infant. LR with 20 units pitocin started at bolus rate for active 3rd stage management. 160 Spontaneous delivery of intact placenta. See PACU flowsheet for recovery vital signs, fundal and lochia assessments.  Pt able to move legs around on bed but unable to lift legs straight up off bed. Will continue to monitor. Epidural catheter DC'd with tip intact. Pt tolerated procedure well.  Pt remains unable to lift legs straight up off bed. Will continue to monitor.  Report given to oncoming shift. Pt now able to lift both legs straight up off bed.

## 2017-04-13 NOTE — ANESTHESIA PROCEDURE NOTES
Epidural Block    Start time: 4/13/2017 12:10 PM  End time: 4/13/2017 12:30 PM  Performed by: Gabe Lemons by: Ole Turk     Pre-Procedure  Indication: at surgeon's request and primary anesthetic    Preanesthetic Checklist: patient identified, risks and benefits discussed, anesthesia consent, site marked, patient being monitored, timeout performed and anesthesia consent    Timeout Time: 12:12        Epidural:   Patient position:  Seated  Prep region:  Lumbar  Prep: Betadine and Patient draped    Location:  L3-4    Needle and Epidural Catheter:   Needle Type:  Tuohy  Needle Gauge:  18 G  Injection Technique:  Loss of resistance using saline  Attempts:  1  Catheter Size:  20 G  Catheter at Skin Depth (cm):  12  Depth in Epidural Space (cm):  5  Events: no blood with aspiration, no cerebrospinal fluid with aspiration and no paresthesia    Test Dose:  Negative and lidocaine 1.5% w/ epi    Assessment:   Catheter Secured:  Tegaderm and tape  Insertion:  Uncomplicated  Patient tolerance:  Patient tolerated the procedure well with no immediate complications  For Vitals see nursing notes.     Yola Alejandra MD  12:29 PM

## 2017-04-13 NOTE — PROGRESS NOTES
OB Progress Note    S: Pt reports pain in lower vagina. O: Patient Vitals for the past 4 hrs:   Pulse BP SpO2   17 1234 74 134/79 -   17 1232 90 (!) 127/91 98 %   17 1230 86 121/84 -   17 1228 83 125/77 -   17 1226 76 125/75 -   17 1224 99 (!) 146/92 -   17 1222 99 128/86 99 %   17 1220 94 125/87 -   17 1219 (!) 101 125/85 -   17 1218 90 124/87 -   17 1201 71 119/78 -   17 1130 75 122/78 -   17 1100 81 108/80 -   17 1030 85 117/79 -   17 1000 78 105/65 -   17 0930 77 108/60 -   17 0900 85 129/87 -            VE: 6/80/-1       TOCO: q 2-3 minutes       FHT: category1       A/P: IUP at  [id number] vtx well applied to cervix and cervix anterior. Expect .                        Zan Bello MD  2017
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
## 2017-04-13 NOTE — PROGRESS NOTES
This 32 yr old G 2 P 1 at 38+6 wks admitted to L&D rm 20 with c/o contrx q 2-3 min x 3 hours. Pt changed clothes and into bed, EFM and toco applied. SVE done. Vitals signs done. Admission paper work signed.

## 2017-04-13 NOTE — L&D DELIVERY NOTE
Delivery Summary    Urge to push; and pushed well.  of VFi over intact perineum ; CAN X 1 loose. Baby with spont cry and pink , to mom's abdomen. apgars 8/9. Dr Blossom Walsh request baby to warmer ; back to mom after assessment-skin to skin. Placenta delivered intact with 3 CV centrally inserted. Dad cut cord. Perineum inspected intact.  cc. Malia    Patient: Yolanda Swenson MRN: [id number]  SSN: [id number]    YOB: 1985  Age: 32 y.o. Sex: female        Labor Events:    Labor: No    Rupture Date: 2017    Rupture Time: 10:00 AM    Rupture Type SROM    Amniotic Fluid Volume: None     Amniotic Fluid Description: Clear  None    Induction: None         Augmentation: Oxytocin    Labor Complications: Dysfunctional Labor     Additional Complications:        Cervical Ripening:       None      Delivery Events:  Episiotomy: None    Laceration(s): None       Repaired: None     Number of Repair Packets:      Suture Type and Size: None        Estimated Blood Loss (ml): 300        Information for the patient's :  Miguel Orozco [[id number]]     Delivery Summary - Baby    Delivery Date: 2017   Delivery Time: 3:54 PM   Delivery Type: Vaginal, Spontaneous Delivery  Sex:  female  Gestational Age: 39w0d  Delivery Clinician:  Kendra Galan  Living?: Yes   Delivery Location: L&D             APGARS  One minute Five minutes Ten minutes   Skin Color: 0    1       Heart Rate: 2   2         Reflex Irritability: 2   2         Muscle Tone: 2   2       Respiration: 2   2         Total: 8   9           Presentation: Vertex  Position:     Anterior  Resuscitation Method:  Suctioning-bulb; Tactile Stimulation     Meconium Stained: None    Cord Information: 3 Vessels   Complications: None  Cord Blood Sent?:  Yes    Blood Gases Sent?:  No    Placenta:  Date/Time:   4:04 PM  Removal: Spontaneous      Appearance: Normal;Intact     Lucile Measurements:  Birth Weight: 3.9 kg    Birth Length:     Head Circumference:       Chest Circumference:      Abdominal Girth:       Other Providers:   Yon COLON;Rekha WHITTINGTON MANDY M.;;;;;;Caleb BOURNE Obstetrician;Primary Nurse;Primary [place] Nurse;Nicu Nurse;Neonatologist;Anesthesiologist;Crna;Nurse Practitioner;Midwife;Nursery Nurse           Cord Blood Results:  Information for the patient's :  Sheila Jaeger [[id number]]   No results found for: ABORH, PCTABR, PCTDIG, BILI, ABORHEXT, ABORH    Information for the patient's :  Sheila Jaeger [[id number]]   No results found for: APH, APCO2, APO2, AHCO3, ABEC, ABDC, O2ST, SITE, RSCOM, PHI, Whit, PO2I, HCO3I, SO2I, IBD     Information for the patient's :  Sheila Jaeger [[id number]]   No results found for: EPHV, PCO2V, PO2V, HCO3V, O2STV, EBDV

## 2017-04-13 NOTE — ANESTHESIA PROCEDURE NOTES
Epidural Block    Start time: 4/13/2017 1:10 PM  End time: 4/13/2017 1:22 PM  Performed by: Karyle Busman  Authorized by: Karyle Busman     Pre-Procedure  Indication: at surgeon's request and primary anesthetic    Preanesthetic Checklist: patient identified, risks and benefits discussed, anesthesia consent, site marked, patient being monitored, timeout performed and anesthesia consent      Epidural:   Patient position:  Seated  Prep region:  Lumbar  Prep: Betadine and Patient draped    Location:  L2-3    Needle and Epidural Catheter:   Needle Type:  Tuohy  Needle Gauge:  18 G  Injection Technique:  Loss of resistance using saline  Attempts:  1  Catheter Size:  20 G  Catheter at Skin Depth (cm):  12  Depth in Epidural Space (cm):  5  Events: no blood with aspiration, no cerebrospinal fluid with aspiration and no paresthesia    Test Dose:  Negative and lidocaine 1.5% w/ epi    Assessment:   Catheter Secured:  Tegaderm and tape  Insertion:  Uncomplicated  Patient tolerance:  Patient tolerated the procedure well with no immediate complications  For Vitals see nursing notes.     Rayshawn Morse MD  1:34 PM

## 2017-04-13 NOTE — PROGRESS NOTES
Labor Progress Note  Elsy Campbell has rested. SVE at 6 cms but head is very high. Romana Pierini agrees to augmentation to bring head down. Assisted into semi-Fowlers. Pelvic exam:       Cervical Exam  Dilation (cm): 6  Eff: 50 %  Station: -4  Cervical Consistency: Moderate  Baby Position: Vertex  Membrane Status: Intact  FHTs Reactive from 130  UCs Irregular and mild to moderate    Visit Vitals    /81    Pulse 80    Temp 98.2 °F (36.8 °C)    Resp 18    Ht 5' 5\" (1.651 m)    Wt 121.6 kg (268 lb)    Breastfeeding Yes    BMI 44.6 kg/m2       Temp (24hrs), Av.9 °F (36.6 °C), Min:97.5 °F (36.4 °C), Max:98.2 °F (36.8 °C)      Recent Labs      17   2140   WBC  14.1*   HGB  10.9*           Assessment: Progressing in dilation but not descent. Category 1   Plan: Pitocin augmentation now and AROM to follow. Dr Flash Mullins called for report.    Luisana Hansen CNM  2017  6:45 AM

## 2017-04-14 LAB
HCT VFR BLD AUTO: 27.1 % (ref 35–45)
HGB BLD-MCNC: 8.9 G/DL (ref 12–16)

## 2017-04-14 PROCEDURE — 85014 HEMATOCRIT: CPT | Performed by: ADVANCED PRACTICE MIDWIFE

## 2017-04-14 PROCEDURE — 36415 COLL VENOUS BLD VENIPUNCTURE: CPT | Performed by: ADVANCED PRACTICE MIDWIFE

## 2017-04-14 PROCEDURE — 74011250637 HC RX REV CODE- 250/637: Performed by: ADVANCED PRACTICE MIDWIFE

## 2017-04-14 PROCEDURE — 65270000029 HC RM PRIVATE

## 2017-04-14 PROCEDURE — 85018 HEMOGLOBIN: CPT | Performed by: ADVANCED PRACTICE MIDWIFE

## 2017-04-14 RX ORDER — IRON POLYSACCHARIDE COMPLEX 150 MG
1 CAPSULE ORAL DAILY
Status: DISCONTINUED | OUTPATIENT
Start: 2017-04-14 | End: 2017-04-15 | Stop reason: HOSPADM

## 2017-04-14 RX ADMIN — IBUPROFEN 800 MG: 400 TABLET, FILM COATED ORAL at 15:45

## 2017-04-14 RX ADMIN — ACETAMINOPHEN 650 MG: 325 TABLET, FILM COATED ORAL at 01:56

## 2017-04-14 RX ADMIN — ACETAMINOPHEN 650 MG: 325 TABLET, FILM COATED ORAL at 20:01

## 2017-04-14 RX ADMIN — Medication 150 MG: at 12:19

## 2017-04-14 RX ADMIN — IBUPROFEN 800 MG: 400 TABLET, FILM COATED ORAL at 06:09

## 2017-04-14 RX ADMIN — ACETAMINOPHEN 650 MG: 325 TABLET, FILM COATED ORAL at 12:19

## 2017-04-14 NOTE — PROGRESS NOTES
Patient ambulating without any complaints and patient voiding without problems. Pain controlled with Motrin and Tylenol. Bonding well with infant. Vital signs stable.

## 2017-04-14 NOTE — ROUTINE PROCESS
Verbal shift change report given by Stoney Moffett RN.      Report consisted of patients Situation, Background, Assessment and Recommendations(SBAR). Information from the following report(s) SBAR, Delivery Summary, Intake/Output, MAR, and Recent Results were reviewed with the receiving nurse. Opportunity for questions and clarification was provided. Care assumed.

## 2017-04-14 NOTE — PROGRESS NOTES
0700 Received report from 3 Brigette Chavez rn to oncoming nurse TONIO Johnson rn via sbar at bedside. Patient resting declines any needs, bonding with baby.

## 2017-04-14 NOTE — PROGRESS NOTES
Baby announcement.     [address] [facility]   Staff [address] [facility]   [phone]

## 2017-04-14 NOTE — LACTATION NOTE
Mom states baby has nursed well several times but is unsure of latch. Mom had problems with first baby latching. Mom has extra large breast but, baby can position well in football hold. Helped position and baby latched briefly after several attempts - baby is very sleepy. Baby sucked briefly. Discussed latch, positions, nursing pattern and expectations, sucking needs. Mom can easily express colostrum for baby. Info sheet and daily log given. Offered help with feedings.

## 2017-04-14 NOTE — PROGRESS NOTES
PPD # 1    Patient doing well post-partum without significant complaint. Voiding without difficulty, normal lochia. Breastfeeding well. Baby stable. Vitals:  Patient Vitals for the past 8 hrs:   BP Temp Pulse Resp SpO2   17 0340 127/90 98.2 °F (36.8 °C) 85 16 99 %     Temp (24hrs), Av.1 °F (36.7 °C), Min:97.6 °F (36.4 °C), Max:98.6 °F (37 °C)      Vital signs stable, afebrile. Exam:  Patient without distress. Breasts intact and nontender               Abdomen soft, fundus firm at level of umbilicus, nontender               Perineum with normal lochia noted. Lower extremities are negative for swelling, cords or tenderness. Lab/Data Review:  CBC:   Lab Results   Component Value Date/Time    HGB 8.9 (L) 2017 06:10 AM    HCT 27.1 (L) 2017 06:10 AM     Assessment and Plan:  Patient appears to be having uncomplicated post-partum course. Continue routine perineal care and maternal education. Plan discharge tomorrow if no problems occur.     Delia Santos CNM  2017  8:24 AM

## 2017-04-14 NOTE — ANESTHESIA POSTPROCEDURE EVALUATION
Post-Anesthesia Evaluation and Assessment    Patient: Kay Pyle MRN: [id number]  SSN: [id number]    YOB: 1985  Age: 32 y.o. Sex: female      Data from PACU flowsheet    Cardiovascular Function/Vital Signs  Visit Vitals    /90 (BP 1 Location: Left arm, BP Patient Position: At rest)    Pulse 85    Temp 36.8 °C (98.2 °F)    Resp 16    Ht 5' 5\" (1.651 m)    Wt 121.6 kg (268 lb)    SpO2 99%    Breastfeeding Yes    BMI 44.6 kg/m2       Patient is status post anesthesia    Nausea/Vomiting: controlled    Postoperative hydration reviewed and adequate. Pain:  Managed    Neurological Status: At baseline    Mental Status and Level of Consciousness: Alert and oriented     Pulmonary Status:   Adequate oxygenation and airway patent    Complications related to anesthesia: None    Post-anesthesia assessment completed.  No concerns    Signed By: Avril Townsend CRNA     April 14, 2017

## 2017-04-14 NOTE — ROUTINE PROCESS
TRANSFER - IN REPORT:    Verbal report received from PRADIP Mcguire(name) on Sandee Landers  being received from L&D(unit) for routine progression of care      Report consisted of patients Situation, Background, Assessment and   Recommendations(SBAR). Information from the following report(s) SBAR, Kardex, Intake/Output, MAR and Recent Results was reviewed with the receiving nurse. Opportunity for questions and clarification was provided. Assessment completed upon patients arrival to unit and care assumed.

## 2017-04-14 NOTE — ROUTINE PROCESS
Received  Report  From off going shift. Received pt sitting up in bed, holding baby, comfortable. 1915 Pt feels the need to void, able to lift bottom off the bed. OOb to bathroom, steady gait, voided, passed a sm clot. Jose E care done ,ice pad applied as pt feels little swollen.   2000. Pt states  Felt a little gush, ice pad didn't absorb, fundus firm @ umbilicus. Oob voided ,jose e care done, no ice pad   . 2010. Pt transferred to [place] [id number] with baby and . Pt less crampy after voiding, comfortable . Settled in bed. Call bell @ hand.

## 2017-04-14 NOTE — LACTATION NOTE
Mom asked for help. Got baby latched in football on right side, sucked briefly. Would not stay latched. Switched to left side, tried cradle hold but it was uncomfortable for mom. Switched to football hold and baby latched well. Reminded mom that baby needs to be far down on areola. Baby has peed only once, right after delivery. Baby is now 25 hours old. Offered our breast pump if baby needs more colostrum volume.

## 2017-04-15 VITALS
HEIGHT: 65 IN | HEART RATE: 79 BPM | TEMPERATURE: 98.3 F | BODY MASS INDEX: 44.65 KG/M2 | OXYGEN SATURATION: 100 % | WEIGHT: 268 LBS | SYSTOLIC BLOOD PRESSURE: 138 MMHG | DIASTOLIC BLOOD PRESSURE: 89 MMHG | RESPIRATION RATE: 18 BRPM

## 2017-04-15 PROCEDURE — 74011250637 HC RX REV CODE- 250/637: Performed by: ADVANCED PRACTICE MIDWIFE

## 2017-04-15 RX ORDER — DOCUSATE SODIUM 100 MG/1
100 CAPSULE, LIQUID FILLED ORAL
Qty: 60 CAP | Refills: 2 | Status: SHIPPED | OUTPATIENT
Start: 2017-04-15

## 2017-04-15 RX ORDER — IBUPROFEN 800 MG/1
800 TABLET ORAL
Qty: 60 TAB | Refills: 0 | Status: SHIPPED | OUTPATIENT
Start: 2017-04-15 | End: 2017-04-15

## 2017-04-15 RX ORDER — IRON POLYSACCHARIDE COMPLEX 150 MG
150 CAPSULE ORAL DAILY
Qty: 30 CAP | Refills: 2 | Status: SHIPPED | OUTPATIENT
Start: 2017-04-15 | End: 2017-04-15

## 2017-04-15 RX ORDER — DOCUSATE SODIUM 100 MG/1
100 CAPSULE, LIQUID FILLED ORAL
Qty: 60 CAP | Refills: 1 | Status: SHIPPED | OUTPATIENT
Start: 2017-04-15 | End: 2017-04-15

## 2017-04-15 RX ORDER — IRON POLYSACCHARIDE COMPLEX 150 MG
150 CAPSULE ORAL DAILY
Qty: 30 CAP | Refills: 3 | Status: SHIPPED | OUTPATIENT
Start: 2017-04-15

## 2017-04-15 RX ORDER — IBUPROFEN 800 MG/1
800 TABLET ORAL
Qty: 60 TAB | Refills: 0 | Status: SHIPPED | OUTPATIENT
Start: 2017-04-15

## 2017-04-15 RX ADMIN — Medication 150 MG: at 08:15

## 2017-04-15 RX ADMIN — IBUPROFEN 800 MG: 400 TABLET, FILM COATED ORAL at 00:05

## 2017-04-15 RX ADMIN — IBUPROFEN 800 MG: 400 TABLET, FILM COATED ORAL at 08:17

## 2017-04-15 RX ADMIN — ACETAMINOPHEN 650 MG: 325 TABLET, FILM COATED ORAL at 05:42

## 2017-04-15 NOTE — DISCHARGE SUMMARY
Obstetrical Discharge Summary     Name: Carolina Rueda MRN: [id number]  SSN: [id number]    YOB: 1985  Age: 32 y.o. Sex: female      Admit Date: 2017    Discharge Date: 4/15/2017     Admitting Physician: Ronan Sherwood MD     Attending Physician:  Ronan Sherwood MD     * Admission Diagnoses:  [address] and delivery indication for care or intervention    * Discharge Diagnoses:   Information for the patient's :  Tamera Edmonds [[id number]]   Delivery of a 3.9 kg female infant via Vaginal, Spontaneous Delivery on 2017 at 3:54 PM  by . Apgars were 8 and 9. Additional Diagnoses:   Hospital Problems as of 4/15/2017  Date Reviewed: 4/15/2017          Codes Class Noted - Resolved POA    * (Principal)Postpartum care following vaginal delivery ICD-10-CM: Z39.2  ICD-9-CM: V24.2  4/15/2017 - Present No        Group B Streptococcus carrier state affecting pregnancy ICD-10-CM: O99.820  ICD-9-CM: 648.90, V02.51  2017 - Present Yes        Increased BMI ICD-10-CM: R63.8  ICD-9-CM: 783.9  2017 - Present Yes        RESOLVED: Labor and delivery indication for care or intervention ICD-10-CM: O75.9  ICD-9-CM: 659.90  2017 - 4/15/2017 Unknown             Lab Results   Component Value Date/Time    ABO/Rh(D) A POSITIVE 2017 09:40 PM    Rubella, External Immune 2016    GrBStrep, External positive 2016    ABO,Rh A positive 2016      Immunization History   Administered Date(s) Administered    DTaP 2017       * Procedures:          * Discharge Condition: stable    * Hospital Course: Normal hospital course following the delivery. * Disposition: Home    Discharge Medications:   Current Discharge Medication List      START taking these medications    Details   ibuprofen (MOTRIN) 800 mg tablet Take 1 Tab by mouth every eight (8) hours as needed.   Qty: 60 Tab, Refills: 0      iron polysaccharides (NIFEREX) 150 mg iron capsule Take 1 Cap by mouth daily.  Qty: 30 Cap, Refills: 2      docusate sodium (COLACE) 100 mg capsule Take 1 Cap by mouth two (2) times daily as needed for Constipation. Qty: 60 Cap, Refills: 1         CONTINUE these medications which have NOT CHANGED    Details   PNV WE.49/OHBJUSN FUM/FOLIC AC (PRENATAL PO) Take  by mouth. * Follow-up Care/Patient Instructions:   Activity: Activity as tolerated, No sex for 6 weeks and No heavy lifting for 6 weeks  Diet: Regular Diet    Follow-up Information     Follow up With Details Comments Viola Rush CNM In 6 weeks  [address]  [address] [facility]   [facility]  [phone]             Signed By:  José Miguel Resendzi CNM     April 15, 2017

## 2017-04-15 NOTE — PROGRESS NOTES
Progress Note    Patient: Yolanda Swenson MRN: [id number]     YOB: 1985  Age: 32 y.o. Subjective:     Postpartum Day: 2    The patient is feeling well. Pain is  well controlled with current medications. Urinary output is adequate. Baby is frequently feeding via breast without difficulty. Madeleine Sanchez has had very little sleep. She is discussing her hx PPD and reviewing various types. Objective:      Patient Vitals for the past 12 hrs:   Temp Pulse Resp BP SpO2   04/15/17 0005 98.8 °F (37.1 °C) 80 18 120/76 100 %       General:    fatigued   Lochia:  appropriate   Uterine Fundus:   firm @ umbilicus    Perineum:  Intact    DVT Evaluation:  No evidence of DVT seen on physical exam.     Assessment:     Delivery: spontaneous vaginal delivery; acute blood loss anemia    Plan:     Doing well postpartum vaginal delivery. Plan DC home today. Discussed counselor resources with pt and . Call counselor if in need of assistance for postpartum mood disorder. Reviewed rx's. Follow-up in the office in 6 weeks. Call prn. Current Discharge Medication List      START taking these medications    Details   ibuprofen (MOTRIN) 800 mg tablet Take 1 Tab by mouth every eight (8) hours as needed. Qty: 60 Tab, Refills: 0      iron polysaccharides (NIFEREX) 150 mg iron capsule Take 1 Cap by mouth daily. Qty: 30 Cap, Refills: 2      docusate sodium (COLACE) 100 mg capsule Take 1 Cap by mouth two (2) times daily as needed for Constipation. Qty: 60 Cap, Refills: 1         CONTINUE these medications which have NOT CHANGED    Details   PNV CP.20/RYINOBY FUM/FOLIC AC (PRENATAL PO) Take  by mouth.              Signed By: Leora Solano CNM     April 15, 2017

## 2017-04-15 NOTE — PROGRESS NOTES
~~ 1020 PT GIVEN WRITTEN D/C INSTRUCTIONS FOR HERSELF & BABY. SAMPLE BG PROVIDED. PT SNUGGLING BABY, NO SUPPORT PERSON HERE CURRENTLY.

## 2017-04-15 NOTE — ROUTINE PROCESS
Bedside and Verbal shift change report given to Elicia Herrmann RN (oncoming nurse) by ADELINA Ruiz RN (offgoing nurse). Report included the following information SBAR, Kardex, MAR and Recent Results. 1310  D/C instructions reviewed with pt, understanding verbalized. 1325  Pt d/c in stable condition via w/c with infant in arms.

## 2017-04-15 NOTE — PROGRESS NOTES
Patient rested fairly comfortably throughout the night. Voiding without problems. Pain related to cramps and back at epidural site managed well with Tylenol and Motrin. Up and about in room without complaints. Vital signs WDL. Breastfeeding is going well. FOB at [facility], supportive. Mom and FOB are bonding appropriately with infant.

## 2017-04-15 NOTE — DISCHARGE INSTRUCTIONS

## 2017-04-16 ENCOUNTER — APPOINTMENT (OUTPATIENT)
Dept: ULTRASOUND IMAGING | Age: 32
End: 2017-04-16
Attending: PHYSICIAN ASSISTANT
Payer: OTHER GOVERNMENT

## 2017-04-16 ENCOUNTER — HOSPITAL ENCOUNTER (OUTPATIENT)
Age: 32
Setting detail: OBSERVATION
Discharge: HOME | End: 2017-04-17
Attending: EMERGENCY MEDICINE | Admitting: OBSTETRICS & GYNECOLOGY
Payer: OTHER GOVERNMENT

## 2017-04-16 DIAGNOSIS — R10.2 PELVIC PAIN IN FEMALE: Primary | ICD-10-CM

## 2017-04-16 DIAGNOSIS — R93.89 ABNORMAL ULTRASOUND: ICD-10-CM

## 2017-04-16 DIAGNOSIS — D72.829 LEUKOCYTOSIS, UNSPECIFIED TYPE: ICD-10-CM

## 2017-04-16 LAB
ABO + RH BLD: NORMAL
ALBUMIN SERPL BCP-MCNC: 2.8 G/DL (ref 3.4–5)
ALBUMIN/GLOB SERPL: 0.7 {RATIO} (ref 0.8–1.7)
ALP SERPL-CCNC: 127 U/L (ref 45–117)
ALT SERPL-CCNC: 31 U/L (ref 13–56)
ANION GAP BLD CALC-SCNC: 13 MMOL/L (ref 3–18)
APPEARANCE UR: CLEAR
AST SERPL W P-5'-P-CCNC: 24 U/L (ref 15–37)
BACTERIA URNS QL MICRO: ABNORMAL /HPF
BASOPHILS # BLD AUTO: 0 K/UL (ref 0–0.1)
BASOPHILS # BLD: 0 % (ref 0–2)
BILIRUB SERPL-MCNC: 0.4 MG/DL (ref 0.2–1)
BILIRUB UR QL: NEGATIVE
BLOOD GROUP ANTIBODIES SERPL: NORMAL
BUN SERPL-MCNC: 8 MG/DL (ref 7–18)
BUN/CREAT SERPL: 17 (ref 12–20)
CALCIUM SERPL-MCNC: 8.5 MG/DL (ref 8.5–10.1)
CHLORIDE SERPL-SCNC: 107 MMOL/L (ref 100–108)
CO2 SERPL-SCNC: 23 MMOL/L (ref 21–32)
COLOR UR: YELLOW
CREAT SERPL-MCNC: 0.48 MG/DL (ref 0.6–1.3)
DIFFERENTIAL METHOD BLD: ABNORMAL
EOSINOPHIL # BLD: 0.2 K/UL (ref 0–0.4)
EOSINOPHIL NFR BLD: 1 % (ref 0–5)
EPITH CASTS URNS QL MICRO: ABNORMAL /LPF (ref 0–5)
ERYTHROCYTE [DISTWIDTH] IN BLOOD BY AUTOMATED COUNT: 13.2 % (ref 11.6–14.5)
GLOBULIN SER CALC-MCNC: 4.2 G/DL (ref 2–4)
GLUCOSE SERPL-MCNC: 81 MG/DL (ref 74–99)
GLUCOSE UR STRIP.AUTO-MCNC: NEGATIVE MG/DL
HCT VFR BLD AUTO: 29.8 % (ref 35–45)
HGB BLD-MCNC: 9.9 G/DL (ref 12–16)
HGB UR QL STRIP: ABNORMAL
KETONES UR QL STRIP.AUTO: NEGATIVE MG/DL
LEUKOCYTE ESTERASE UR QL STRIP.AUTO: ABNORMAL
LYMPHOCYTES # BLD AUTO: 10 % (ref 21–52)
LYMPHOCYTES # BLD: 1.5 K/UL (ref 0.9–3.6)
MCH RBC QN AUTO: 29.9 PG (ref 24–34)
MCHC RBC AUTO-ENTMCNC: 33.2 G/DL (ref 31–37)
MCV RBC AUTO: 90 FL (ref 74–97)
MONOCYTES # BLD: 0.9 K/UL (ref 0.05–1.2)
MONOCYTES NFR BLD AUTO: 6 % (ref 3–10)
NEUTS SEG # BLD: 12.8 K/UL (ref 1.8–8)
NEUTS SEG NFR BLD AUTO: 83 % (ref 40–73)
NITRITE UR QL STRIP.AUTO: NEGATIVE
PH UR STRIP: 6.5 [PH] (ref 5–8)
PLATELET # BLD AUTO: 338 K/UL (ref 135–420)
PMV BLD AUTO: 9.1 FL (ref 9.2–11.8)
POTASSIUM SERPL-SCNC: 3.6 MMOL/L (ref 3.5–5.5)
PROT SERPL-MCNC: 7 G/DL (ref 6.4–8.2)
PROT UR STRIP-MCNC: NEGATIVE MG/DL
RBC # BLD AUTO: 3.31 M/UL (ref 4.2–5.3)
RBC #/AREA URNS HPF: ABNORMAL /HPF (ref 0–5)
SERVICE CMNT-IMP: NORMAL
SODIUM SERPL-SCNC: 143 MMOL/L (ref 136–145)
SP GR UR REFRACTOMETRY: 1.01 (ref 1–1.03)
SPECIMEN EXP DATE BLD: NORMAL
UROBILINOGEN UR QL STRIP.AUTO: 0.2 EU/DL (ref 0.2–1)
WBC # BLD AUTO: 15.4 K/UL (ref 4.6–13.2)
WBC URNS QL MICRO: ABNORMAL /HPF (ref 0–4)
WET PREP GENITAL: NORMAL

## 2017-04-16 PROCEDURE — 74011250636 HC RX REV CODE- 250/636: Performed by: PHYSICIAN ASSISTANT

## 2017-04-16 PROCEDURE — 85025 COMPLETE CBC W/AUTO DIFF WBC: CPT | Performed by: PHYSICIAN ASSISTANT

## 2017-04-16 PROCEDURE — 87210 SMEAR WET MOUNT SALINE/INK: CPT | Performed by: PHYSICIAN ASSISTANT

## 2017-04-16 PROCEDURE — 74011000250 HC RX REV CODE- 250: Performed by: PHYSICIAN ASSISTANT

## 2017-04-16 PROCEDURE — 74011250637 HC RX REV CODE- 250/637: Performed by: OBSTETRICS & GYNECOLOGY

## 2017-04-16 PROCEDURE — 80053 COMPREHEN METABOLIC PANEL: CPT | Performed by: PHYSICIAN ASSISTANT

## 2017-04-16 PROCEDURE — 87086 URINE CULTURE/COLONY COUNT: CPT | Performed by: PHYSICIAN ASSISTANT

## 2017-04-16 PROCEDURE — 96365 THER/PROPH/DIAG IV INF INIT: CPT

## 2017-04-16 PROCEDURE — 74011000258 HC RX REV CODE- 258: Performed by: PHYSICIAN ASSISTANT

## 2017-04-16 PROCEDURE — 86900 BLOOD TYPING SEROLOGIC ABO: CPT | Performed by: PHYSICIAN ASSISTANT

## 2017-04-16 PROCEDURE — 81001 URINALYSIS AUTO W/SCOPE: CPT | Performed by: PHYSICIAN ASSISTANT

## 2017-04-16 PROCEDURE — 99284 EMERGENCY DEPT VISIT MOD MDM: CPT

## 2017-04-16 PROCEDURE — 96367 TX/PROPH/DG ADDL SEQ IV INF: CPT

## 2017-04-16 PROCEDURE — 76856 US EXAM PELVIC COMPLETE: CPT

## 2017-04-16 PROCEDURE — 74011250637 HC RX REV CODE- 250/637: Performed by: PHYSICIAN ASSISTANT

## 2017-04-16 PROCEDURE — 96366 THER/PROPH/DIAG IV INF ADDON: CPT

## 2017-04-16 PROCEDURE — 99218 HC RM OBSERVATION: CPT

## 2017-04-16 RX ORDER — MORPHINE SULFATE 2 MG/ML
2 INJECTION, SOLUTION INTRAMUSCULAR; INTRAVENOUS
Status: DISCONTINUED | OUTPATIENT
Start: 2017-04-16 | End: 2017-04-16

## 2017-04-16 RX ORDER — IBUPROFEN 400 MG/1
800 TABLET ORAL
Status: COMPLETED | OUTPATIENT
Start: 2017-04-16 | End: 2017-04-16

## 2017-04-16 RX ORDER — OXYCODONE AND ACETAMINOPHEN 5; 325 MG/1; MG/1
1 TABLET ORAL
Status: DISCONTINUED | OUTPATIENT
Start: 2017-04-16 | End: 2017-04-17 | Stop reason: HOSPADM

## 2017-04-16 RX ORDER — AMPICILLIN 2 G/1
2 INJECTION, POWDER, FOR SOLUTION INTRAVENOUS EVERY 6 HOURS
Status: DISCONTINUED | OUTPATIENT
Start: 2017-04-16 | End: 2017-04-16 | Stop reason: RX

## 2017-04-16 RX ORDER — IBUPROFEN 400 MG/1
800 TABLET ORAL
Status: DISCONTINUED | OUTPATIENT
Start: 2017-04-16 | End: 2017-04-17 | Stop reason: HOSPADM

## 2017-04-16 RX ADMIN — GENTAMICIN SULFATE 160 MG: 40 INJECTION, SOLUTION INTRAMUSCULAR; INTRAVENOUS at 15:39

## 2017-04-16 RX ADMIN — AMPICILLIN SODIUM 2 G: 2 INJECTION, POWDER, FOR SOLUTION INTRAVENOUS at 16:44

## 2017-04-16 RX ADMIN — IBUPROFEN 800 MG: 400 TABLET, FILM COATED ORAL at 12:17

## 2017-04-16 RX ADMIN — SODIUM CHLORIDE 1000 ML: 900 INJECTION, SOLUTION INTRAVENOUS at 14:15

## 2017-04-16 RX ADMIN — GENTAMICIN SULFATE 160 MG: 40 INJECTION, SOLUTION INTRAMUSCULAR; INTRAVENOUS at 21:58

## 2017-04-16 RX ADMIN — OXYCODONE HYDROCHLORIDE AND ACETAMINOPHEN 1 TABLET: 5; 325 TABLET ORAL at 20:54

## 2017-04-16 RX ADMIN — AMPICILLIN SODIUM 2 G: 2 INJECTION, POWDER, FOR SOLUTION INTRAVENOUS at 20:55

## 2017-04-16 RX ADMIN — CLINDAMYCIN PHOSPHATE 900 MG: 150 INJECTION, SOLUTION, CONCENTRATE INTRAVENOUS at 14:16

## 2017-04-16 RX ADMIN — IBUPROFEN 800 MG: 400 TABLET, FILM COATED ORAL at 19:54

## 2017-04-16 NOTE — PROGRESS NOTES
TRANSFER - IN REPORT:    Verbal report received from [facility]) on Chiquis Walter  being received from ED(unit) for routine progression of care      Report consisted of patients Situation, Background, Assessment and   Recommendations(SBAR). Information from the following report(s) SBAR, ED Summary and Procedure Summary was reviewed with the receiving nurse. Opportunity for questions and clarification was provided. Assessment completed upon patients arrival to unit and care assumed. Patient shared that she suffered from Postpartum depression with her first child and is going through it again with this child. 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 She stated she got some counseling, but did not want to get on medication. She stated she was sleep deprived as well . Patient stated she wants to get some counseling again this time, and recently yelled at her infant. Patient given the [place] Postpartum Depression Scale where she scored 14. Patient is currently resting and has no thoughts of harming self. Patient is admitted for observation for UTI , and endometritis and prescribed an antibiotic treatment.

## 2017-04-16 NOTE — IP AVS SNAPSHOT
Current Discharge Medication List  
  
START taking these medications Dose & Instructions Dispensing Information Comments Morning Noon Evening Bedtime  
 cephALEXin 500 mg capsule Commonly known as:  Heath Charmaine Your last dose was: Your next dose is:    
   
   
 Dose:  500 mg Take 1 Cap by mouth four (4) times daily for 10 days. Indications: endometritis Quantity:  40 Cap Refills:  0  
     
   
   
   
  
 clindamycin 300 mg capsule Commonly known as:  CLEOCIN Your last dose was: Your next dose is:    
   
   
 Dose:  300 mg Take 1 Cap by mouth three (3) times daily for 10 days. Indications: endometritis Quantity:  30 Cap Refills:  0 CONTINUE these medications which have NOT CHANGED Dose & Instructions Dispensing Information Comments Morning Noon Evening Bedtime  
 docusate sodium 100 mg capsule Commonly known as:  [place] Given Your last dose was: Your next dose is:    
   
   
 Dose:  100 mg Take 1 Cap by mouth two (2) times daily as needed for Constipation. Quantity:  60 Cap Refills:  2  
     
   
   
   
  
 ibuprofen 800 mg tablet Commonly known as:  MOTRIN Your last dose was: Your next dose is:    
   
   
 Dose:  800 mg Take 1 Tab by mouth every six (6) hours as needed. Quantity:  60 Tab Refills:  0  
     
   
   
   
  
 iron polysaccharides 150 mg iron capsule Commonly known as:  José Miguel Monarch Your last dose was: Your next dose is:    
   
   
 Dose:  150 mg Take 1 Cap by mouth daily. Quantity:  30 Cap Refills:  3 PRENATAL PO Your last dose was: Your next dose is: Take  by mouth. Refills:  0 Where to Get Your Medications Information on where to get these meds will be given to you by the nurse or doctor. ! Ask your nurse or doctor about these medications cephALEXin 500 mg capsule  
 clindamycin 300 mg capsule

## 2017-04-16 NOTE — IP AVS SNAPSHOT
[address] Patient: Chiquis Walter MRN: [id number] QIE:2/4/2140 You are allergic to the following No active allergies Recent Documentation Height Weight BMI OB Status Smoking Status 1.651 m 117 kg 42.93 kg/m2 Recent pregnancy Never Smoker Unresulted Labs Order Current Status CBC WITH AUTOMATED DIFF Preliminary result Emergency Contacts Name Discharge Info Relation Home Work Mobile Reji Gomez DISCHARGE CAREGIVER [3] Spouse [3]   [phone] About your hospitalization You were admitted on:  April 16, 2017 You last received care in the:  [facility] [address] You were discharged on:  April 17, 2017 Unit phone number:  [phone] Why you were hospitalized Your primary diagnosis was:  Not on File Your diagnoses also included:  Endometritis Providers Seen During Your Hospitalizations Provider Role Specialty Primary office phone Ray Boudreaux DO Attending Provider Emergency Medicine [phone] Yelitza Leon MD Attending Provider Obstetrics & Gynecology [phone] Your Primary Care Physician (PCP) Primary Care Physician Office Phone Office Fax Monserrat Hart [phone] Follow-up Information Follow up With Details Comments Contact Info Chinedu Horvath MD   [facility] [address] [address] [phone] Yelitza Leon MD In 2 weeks  . [facility] [address] [facility] [address] [phone] Current Discharge Medication List  
  
START taking these medications Dose & Instructions Dispensing Information Comments Morning Noon Evening Bedtime  
 cephALEXin 500 mg capsule Commonly known as:  Larina Grise Your last dose was:     
   
Your next dose is:    
   
   
 Dose:  500 mg  
 Take 1 Cap by mouth four (4) times daily for 10 days. Indications: endometritis Quantity:  40 Cap Refills:  0  
     
   
   
   
  
 clindamycin 300 mg capsule Commonly known as:  CLEOCIN Your last dose was: Your next dose is:    
   
   
 Dose:  300 mg Take 1 Cap by mouth three (3) times daily for 10 days. Indications: endometritis Quantity:  30 Cap Refills:  0 CONTINUE these medications which have NOT CHANGED Dose & Instructions Dispensing Information Comments Morning Noon Evening Bedtime  
 docusate sodium 100 mg capsule Commonly known as:  Tumtum Given Your last dose was: Your next dose is:    
   
   
 Dose:  100 mg Take 1 Cap by mouth two (2) times daily as needed for Constipation. Quantity:  60 Cap Refills:  2  
     
   
   
   
  
 ibuprofen 800 mg tablet Commonly known as:  MOTRIN Your last dose was: Your next dose is:    
   
   
 Dose:  800 mg Take 1 Tab by mouth every six (6) hours as needed. Quantity:  60 Tab Refills:  0  
     
   
   
   
  
 iron polysaccharides 150 mg iron capsule Commonly known as:  [place] Your last dose was: Your next dose is:    
   
   
 Dose:  150 mg Take 1 Cap by mouth daily. Quantity:  30 Cap Refills:  3 PRENATAL PO Your last dose was: Your next dose is: Take  by mouth. Refills:  0 Where to Get Your Medications Information on where to get these meds will be given to you by the nurse or doctor. ! Ask your nurse or doctor about these medications  
  cephALEXin 500 mg capsule  
 clindamycin 300 mg capsule Discharge Instructions Postpartum Endometritis: Care Instructions Your Care Instructions Postpartum endometritis is an infection of the lining of the uterus after you give birth. It is treated with antibiotics. It is very important to treat this problem. If you don't, you can get a more serious infection. It could cause problems, such as scars on the pelvic organs. Or it could prevent you from having more children. Follow-up care is a key part of your treatment and safety. Be sure to make and go to all appointments, and call your doctor if you are having problems. It's also a good idea to know your test results and keep a list of the medicines you take. How can you care for yourself at home? · Take your antibiotics as directed. Do not stop taking them just because you feel better. You need to take the full course of antibiotics. · Rest until you feel better. · Take an over-the-counter pain medicine, such as acetaminophen (Tylenol) or ibuprofen (Advil, Motrin). Read and follow all instructions on the label. · Do not take two or more pain medicines at the same time unless the doctor told you to. Many pain medicines have acetaminophen, which is Tylenol. Too much acetaminophen (Tylenol) can be harmful. · If you have belly pain, use a hot water bottle. Or you can use a heating pad set on low. Put a thin cloth between the heating pad and your skin. · Do not have sex or use tampons until your doctor says it's safe. Use pads instead of tampons. When should you call for help? Call 911 anytime you think you may need emergency care. For example, call if: 
· You passed out (lost consciousness). Call your doctor now or seek immediate medical care if: 
· You have severe vaginal bleeding. This means you are soaking through a pad each hour for 2 or more hours and passing clots of blood. · You are dizzy or lightheaded, or you feel like you may faint. · You have a new or higher fever. · You have new or worse pain in your belly or pelvis. Watch closely for changes in your health, and be sure to contact your doctor if: 
 
· Your vaginal bleeding is getting worse. · You have new or worse vaginal discharge. · You do not get better as expected. · If you have a fever of 100.4 or above Where can you learn more? Go to http://brooklynn-sebastien.info/. Enter G147 in the search box to learn more about \"Postpartum Endometritis: Care Instructions. \" Current as of: May 30, 2016 Content Version: 11.2 © 1029-8363 Graphicly. Care instructions adapted under license by [employer] (which disclaims liability or warranty for this information). If you have questions about a medical condition or this instruction, always ask your healthcare professional. [place] [address] any warranty or liability for your use of this information. Depression After Childbirth: Care Instructions Your Care Instructions Many women get the \"baby blues\" during the first few days after childbirth. You may lose sleep, feel irritable, and cry easily. You may feel happy one minute and sad the next. Hormone changes are one cause of these emotional changes. Also, the demands of a new baby, along with visits from relatives or other family needs, add to a mother's stress. The \"baby blues\" often peak around the fourth day. Then they ease up in less than 2 weeks. If your moodiness or anxiety lasts for more than 2 weeks, or if you feel like life is not worth living, you may have postpartum depression. This is different for each mother. Some mothers with serious depression may worry intensely about their infant's well-being. Others may feel distant from their child. Some mothers might even feel that they might harm their baby. A mother may have signs of paranoia, wondering if someone is watching her. Depression is not a sign of weakness. It is a medical condition that requires treatment. Medicine and counseling often work well to reduce depression. Talk to your doctor about taking antidepressant medicine while breastfeeding. Follow-up care is a key part of your treatment and safety. Be sure to make and go to all appointments, and call your doctor if you are having problems. It's also a good idea to know your test results and keep a list of the medicines you take. How do you know if you are depressed? With all the changes in your life, you may not know if you are depressed. Pregnancy sometimes causes changes in how you feel that are similar to the symptoms of depression. Symptoms of depression include: · Feeling sad or hopeless and losing interest in daily activities. These are the most common symptoms of depression. · Sleeping too much or not enough. · Feeling tired. You may feel as if you have no energy. · Eating too much or too little. · Writing or talking about death, such as writing suicide notes or talking about guns, knives, or pills. Keep the numbers for these national suicide hotlines: 4-811-789-TALK (5-682.529.2027) and 8-925-GELYYJR (3-389.267.1936). If you or someone you know talks about suicide or feeling hopeless, get help right away. How can you care for yourself at home? · Be safe with medicines. Take your medicines exactly as prescribed. Call your doctor if you think you are having a problem with your medicine. · Eat a healthy diet so that you can keep up your energy. · Get regular daily exercise, such as walks, to help improve your mood. · Get as much sunlight as possible. Keep your shades and curtains open. Get outside as much as you can. · Avoid using alcohol or other substances to feel better. · Get as much rest and sleep as possible. Avoid doing too much. Being too tired can increase depression. · Play stimulating music throughout your day and soothing music at night. · Schedule outings and visits with friends and family. Ask them to call you regularly, so that you do not feel alone. · Ask for help with preparing food and other daily tasks.  Family and friends are often happy to help a mother with a . · Be honest with yourself and those who care about you. Tell them about your struggle. · Join a support group of new mothers. No one can better understand the challenges of caring for a  than other new mothers. · If you feel like life is not worth living or are feeling hopeless, get help right away. Keep the numbers for these national suicide hotlines: 9-425-997-TALK (4-107.333.3410) and 9-560-DBLATPJ (7-943.483.5458). When should you call for help? Call 911 anytime you think you may need emergency care. For example, call if: 
· You feel you cannot stop from hurting yourself, your baby, or someone else. Call your doctor now or seek immediate medical care if: 
· You are having trouble caring for yourself or your baby. · You hear voices. Watch closely for changes in your health, and be sure to contact your doctor if: 
· You have problems with your depression medicine. · You do not get better as expected. Where can you learn more? Go to http://brooklynn-sebastien.info/. Enter A884 in the search box to learn more about \"Depression After Childbirth: Care Instructions. \" Current as of: 2016 Content Version: 11.2 © 8628-0322 Healthwise[employer]orporated. Care instructions adapted under license by [employer] ( [employer] ) (which disclaims liability or warranty for this information). If you have questions about a medical condition or this instruction, always ask your healthcare professional. [address] any warranty or liability for your use of this information. 
  
 
 
Kannuu Activation Thank you for requesting access to Kannuu. Please follow the instructions below to securely access and download your online medical record. Kannuu allows you to send messages to your doctor, view your test results, renew your prescriptions, schedule appointments, and more. How Do I Sign Up? 1. In your internet browser, go to [email or web address] 
2. Click on the First Time User? Click Here link in the Sign In box. You will be redirect to the New Member Sign Up page. 3. Enter your Leatt Access Code exactly as it appears below. You will not need to use this code after youve completed the sign-up process. If you do not sign up before the expiration date, you must request a new code. Leatt Access Code: [email or web address] Expires: 2017  1:41 PM (This is the date your Leatt access code will ) 4. Enter the last four digits of your Social Security Number (xxxx) and Date of Birth (mm/dd/yyyy) as indicated and click Submit. You will be taken to the next sign-up page. 5. Create a Leatt ID. This will be your Leatt login ID and cannot be changed, so think of one that is secure and easy to remember. 6. Create a Leatt password. You can change your password at any time. 7. Enter your Password Reset Question and Answer. This can be used at a later time if you forget your password. 8. Enter your e-mail address. You will receive e-mail notification when new information is available in [address] 9. Click Sign Up. You can now view and download portions of your medical record. 10. Click the Download Summary menu link to download a portable copy of your medical information. Additional Information If you have questions, please visit the Frequently Asked Questions section of the Leatt website at [email or web address] Remember, Leatt is NOT to be used for urgent needs. For medical emergencies, dial 911. Patient armband removed and shredded. Discharge Orders None Leatt Announcement We are excited to announce that we are making your provider's discharge notes available to you in Leatt.   You will see these notes when they are completed and signed by the physician that discharged you from your recent hospital stay. If you have any questions or concerns about any information you see in Flatiron Apps, please call the Health Information Department where you were seen or reach out to your Primary Care Provider for more information about your plan of care. Introducing [facility]! Teresa Rhoades introduces Flatiron Apps patient portal. Now you can access parts of your medical record, email your doctor's office, and request medication refills online. 1. In your internet browser, go to [email or web address] 2. Click on the First Time User? Click Here link in the Sign In box. You will see the New Member Sign Up page. 3. Enter your Flatiron Apps Access Code exactly as it appears below. You will not need to use this code after youve completed the sign-up process. If you do not sign up before the expiration date, you must request a new code. · Flatiron Apps Access Code: [email or web address] Expires: 6/29/2017  1:41 PM 
 
4. Enter the last four digits of your Social Security Number (xxxx) and Date of Birth (mm/dd/yyyy) as indicated and click Submit. You will be taken to the next sign-up page. 5. Create a Flatiron Apps ID. This will be your Flatiron Apps login ID and cannot be changed, so think of one that is secure and easy to remember. 6. Create a Flatiron Apps password. You can change your password at any time. 7. Enter your Password Reset Question and Answer. This can be used at a later time if you forget your password. 8. Enter your e-mail address. You will receive e-mail notification when new information is available in [address] 9. Click Sign Up. You can now view and download portions of your medical record. 10. Click the Download Summary menu link to download a portable copy of your medical information. If you have questions, please visit the Frequently Asked Questions section of the Flatiron Apps website. Remember, Flatiron Apps is NOT to be used for urgent needs. For medical emergencies, dial 911. Now available from your iPhone and Android! General Information Please provide this summary of care documentation to your next provider. Patient Signature:  ____________________________________________________________ Date:  ____________________________________________________________  
  
Arna Star Provider Signature:  ____________________________________________________________ Date:  ____________________________________________________________
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
## 2017-04-16 NOTE — ED TRIAGE NOTES
Pt is postpartum x 2 days and states she began having low abdominal/pelvic pain after having a BM. Vaginal delivery.

## 2017-04-16 NOTE — ED NOTES
Pt states she does not want any pain medicine at this time. Pt instructed to notify nurse if she needs it.

## 2017-04-16 NOTE — ED NOTES
TRANSFER - OUT REPORT:    Verbal report given to Art Stanford RN(name) on Stella Gear  being transferred to Mother and Baby(unit) for routine progression of care       Report consisted of patients Situation, Background, Assessment and   Recommendations(SBAR). Information from the following report(s) SBAR, ED Summary, [facility] and Recent Results was reviewed with the receiving nurse. Lines:   Peripheral IV 04/16/17 Right Antecubital (Active)   Site Assessment Clean, dry, & intact 4/16/2017 11:16 AM   Phlebitis Assessment 0 4/16/2017 11:16 AM   Infiltration Assessment 0 4/16/2017 11:16 AM   Dressing Status Clean, dry, & intact 4/16/2017 11:16 AM        Opportunity for questions and clarification was provided.       Patient transported with:   [employer]

## 2017-04-17 VITALS
OXYGEN SATURATION: 98 % | DIASTOLIC BLOOD PRESSURE: 89 MMHG | HEART RATE: 88 BPM | RESPIRATION RATE: 15 BRPM | HEIGHT: 65 IN | WEIGHT: 258 LBS | BODY MASS INDEX: 42.99 KG/M2 | TEMPERATURE: 98.1 F | SYSTOLIC BLOOD PRESSURE: 128 MMHG

## 2017-04-17 PROBLEM — N71.9 ENDOMETRITIS: Status: ACTIVE | Noted: 2017-04-17

## 2017-04-17 LAB
BACTERIA SPEC CULT: NORMAL
BASOPHILS # BLD AUTO: 0 K/UL (ref 0–0.06)
BASOPHILS # BLD: 0 % (ref 0–3)
DIFFERENTIAL METHOD BLD: ABNORMAL
EOSINOPHIL # BLD: 0.1 K/UL (ref 0–0.4)
EOSINOPHIL NFR BLD: 1 % (ref 0–5)
ERYTHROCYTE [DISTWIDTH] IN BLOOD BY AUTOMATED COUNT: 13.5 % (ref 11.6–14.5)
HCT VFR BLD AUTO: 26.9 % (ref 35–45)
HGB BLD-MCNC: 8.8 G/DL (ref 12–16)
LYMPHOCYTES # BLD AUTO: 11 % (ref 20–51)
LYMPHOCYTES # BLD: 1.4 K/UL (ref 0.8–3.5)
MCH RBC QN AUTO: 30.1 PG (ref 24–34)
MCHC RBC AUTO-ENTMCNC: 32.7 G/DL (ref 31–37)
MCV RBC AUTO: 92.1 FL (ref 74–97)
MONOCYTES # BLD: 0.4 K/UL (ref 0–1)
MONOCYTES NFR BLD AUTO: 3 % (ref 2–9)
NEUTS SEG # BLD: 10.9 K/UL (ref 1.8–8)
NEUTS SEG NFR BLD AUTO: 85 % (ref 42–75)
PLATELET # BLD AUTO: 269 K/UL (ref 135–420)
PLATELET COMMENTS,PCOM: ABNORMAL
PMV BLD AUTO: 8.7 FL (ref 9.2–11.8)
RBC # BLD AUTO: 2.92 M/UL (ref 4.2–5.3)
RBC MORPH BLD: ABNORMAL
SERVICE CMNT-IMP: NORMAL
WBC # BLD AUTO: 12.8 K/UL (ref 4.6–13.2)

## 2017-04-17 PROCEDURE — 74011000258 HC RX REV CODE- 258: Performed by: PHYSICIAN ASSISTANT

## 2017-04-17 PROCEDURE — 74011250636 HC RX REV CODE- 250/636: Performed by: PHYSICIAN ASSISTANT

## 2017-04-17 PROCEDURE — 96366 THER/PROPH/DIAG IV INF ADDON: CPT

## 2017-04-17 PROCEDURE — 85025 COMPLETE CBC W/AUTO DIFF WBC: CPT | Performed by: OBSTETRICS & GYNECOLOGY

## 2017-04-17 PROCEDURE — 99218 HC RM OBSERVATION: CPT

## 2017-04-17 PROCEDURE — 36415 COLL VENOUS BLD VENIPUNCTURE: CPT | Performed by: OBSTETRICS & GYNECOLOGY

## 2017-04-17 PROCEDURE — 74011250637 HC RX REV CODE- 250/637: Performed by: OBSTETRICS & GYNECOLOGY

## 2017-04-17 RX ORDER — CEPHALEXIN 500 MG/1
500 CAPSULE ORAL 4 TIMES DAILY
Qty: 40 CAP | Refills: 0 | Status: SHIPPED | OUTPATIENT
Start: 2017-04-17 | End: 2017-04-27

## 2017-04-17 RX ORDER — CLINDAMYCIN HYDROCHLORIDE 300 MG/1
300 CAPSULE ORAL 3 TIMES DAILY
Qty: 30 CAP | Refills: 0 | Status: SHIPPED | OUTPATIENT
Start: 2017-04-17 | End: 2017-04-17

## 2017-04-17 RX ORDER — CEPHALEXIN 500 MG/1
500 CAPSULE ORAL 4 TIMES DAILY
Qty: 40 CAP | Refills: 0 | Status: SHIPPED | OUTPATIENT
Start: 2017-04-17 | End: 2017-04-17

## 2017-04-17 RX ORDER — CLINDAMYCIN HYDROCHLORIDE 300 MG/1
300 CAPSULE ORAL 3 TIMES DAILY
Qty: 30 CAP | Refills: 0 | Status: SHIPPED | OUTPATIENT
Start: 2017-04-17 | End: 2017-04-27

## 2017-04-17 RX ADMIN — GENTAMICIN SULFATE 160 MG: 40 INJECTION, SOLUTION INTRAMUSCULAR; INTRAVENOUS at 05:41

## 2017-04-17 RX ADMIN — IBUPROFEN 800 MG: 400 TABLET, FILM COATED ORAL at 05:39

## 2017-04-17 RX ADMIN — OXYCODONE HYDROCHLORIDE AND ACETAMINOPHEN 1 TABLET: 5; 325 TABLET ORAL at 05:39

## 2017-04-17 RX ADMIN — AMPICILLIN SODIUM 2 G: 2 INJECTION, POWDER, FOR SOLUTION INTRAVENOUS at 03:26

## 2017-04-17 NOTE — DISCHARGE SUMMARY
Obstetrical Discharge Summary     Name: Swathi Hart MRN: [id number]  SSN: [id number]    YOB: 1985  Age: 32 y.o. Sex: female      Admit Date: 2017    Discharge Date: 2017     Admitting Physician: Mela Cain MD     Attending Physician:  Mela Cain MD     * Admission Diagnoses: Post-Partum Endometritis, Leukocytosis, Pelvic Pain    * Discharge Diagnoses:   Information for the patient's :  Lynn Rankin [[id number]]   Delivery of a 8 lb 9.6 oz (3.9 kg) female infant via Vaginal, Spontaneous Delivery on 2017 at 3:54 PM  by . Apgars were 8 and 9. Additional Diagnoses:   Hospital Problems as of 2017  Date Reviewed: 2017          Codes Class Noted - Resolved POA    Endometritis ICD-10-CM: N71.9  ICD-9-CM: 615.9  2017 - Present Yes             Lab Results   Component Value Date/Time    ABO/Rh(D) A POSITIVE 2017 10:29 AM    Rubella, External Immune 2016    GrBStrep, External positive 2016    ABO,Rh A positive 2016      Immunization History   Administered Date(s) Administered    DTaP 2017       * Procedures: IV antibiotics. * Discharge Condition: improved    [facility] Course: Normal hospital course following the delivery. * Disposition: Home    Discharge Medications:   Current Discharge Medication List      START taking these medications    Details   cephALEXin (KEFLEX) 500 mg capsule Take 1 Cap by mouth four (4) times daily for 10 days. Qty: 40 Cap, Refills: 0      clindamycin (CLEOCIN) 300 mg capsule Take 1 Cap by mouth three (3) times daily for 10 days. Qty: 30 Cap, Refills: 0         CONTINUE these medications which have NOT CHANGED    Details   docusate sodium (COLACE) 100 mg capsule Take 1 Cap by mouth two (2) times daily as needed for Constipation. Qty: 60 Cap, Refills: 2      ibuprofen (MOTRIN) 800 mg tablet Take 1 Tab by mouth every six (6) hours as needed.   Qty: 60 Tab, Refills: 0 iron polysaccharides (NIFEREX) 150 mg iron capsule Take 1 Cap by mouth daily. Qty: 30 Cap, Refills: 3      PNV GE.80/QRHJAOL FUM/FOLIC AC (PRENATAL PO) Take  by mouth. * Follow-up Care/Patient Instructions:   Activity: Activity as tolerated and No heavy lifting, pushing, pulling with the implant side for 2 months  Diet: Regular Diet  Wound Care: None needed    Follow-up Information     Follow up With Details Comments [address] Adrián Dai MD   [facility] [address] [id number] [phone]             Signed By:  Ari Koroma MD     April 17, 2017

## 2017-04-17 NOTE — PROGRESS NOTES
4628- In to see pt and discuss plan of care for the day. Opportunity for questions given and all questions answered. Opportunity to add to plan of care given to the pt and pt verbalized an understanding of all information. Whiteboard updated, armband in place. Call bell within reach, bed in lowest position, wheels locked, will return for shift assessment. 8024- Discharge instructions reviewed with pt and pt verbalized an understanding of all information. Opportunity for questions given and all questions answered. Armbands removed and shredded. Pt escorted to car with hard copy of discharge instructions in hand.

## 2017-04-17 NOTE — LACTATION NOTE
Pt. States that she was concerned regarding breastfeeding on clindamycin. However, her provider discontinued that medication. Discussed supply and demand. She plans to formula feed and breastfeed. No other concerns voiced at this time. Kimberly Do RN, IBCLC.

## 2017-04-17 NOTE — ROUTINE PROCESS
Bedside and Verbal shift change report given to GABBY Guy (oncoming nurse) by Ramakrishna Ledbetter. Etelvina Weaver RN offgoing nurse). Report included the following information SBAR, Kardex, Intake/Output, MAR and Recent Results.

## 2017-04-17 NOTE — DISCHARGE INSTRUCTIONS
Postpartum Endometritis: Care Instructions  Your Care Instructions  Postpartum endometritis is an infection of the lining of the uterus after you give birth. It is treated with antibiotics. It is very important to treat this problem. If you don't, you can get a more serious infection. It could cause problems, such as scars on the pelvic organs. Or it could prevent you from having more children. Follow-up care is a key part of your treatment and safety. Be sure to make and go to all appointments, and call your doctor if you are having problems. It's also a good idea to know your test results and keep a list of the medicines you take. How can you care for yourself at home? · Take your antibiotics as directed. Do not stop taking them just because you feel better. You need to take the full course of antibiotics. · Rest until you feel better. · Take an over-the-counter pain medicine, such as acetaminophen (Tylenol) or ibuprofen (Advil, Motrin). Read and follow all instructions on the label. · Do not take two or more pain medicines at the same time unless the doctor told you to. Many pain medicines have acetaminophen, which is Tylenol. Too much acetaminophen (Tylenol) can be harmful. · If you have belly pain, use a hot water bottle. Or you can use a heating pad set on low. Put a thin cloth between the heating pad and your skin. · Do not have sex or use tampons until your doctor says it's safe. Use pads instead of tampons. When should you call for help? Call 911 anytime you think you may need emergency care. For example, call if:  · You passed out (lost consciousness). Call your doctor now or seek immediate medical care if:  · You have severe vaginal bleeding. This means you are soaking through a pad each hour for 2 or more hours and passing clots of blood. · You are dizzy or lightheaded, or you feel like you may faint. · You have a new or higher fever.   · You have new or worse pain in your belly or pelvis. Watch closely for changes in your health, and be sure to contact your doctor if:    · Your vaginal bleeding is getting worse. · You have new or worse vaginal discharge. · You do not get better as expected. · If you have a fever of 100.4 or above  Where can you learn more? Go to http://brooklynn-sebastien.info/. Enter U230 in the search box to learn more about \"Postpartum Endometritis: Care Instructions. \"  Current as of: May 30, 2016  Content Version: 11.2  © 0530-7314 [employer]. Care instructions adapted under license by [employer] (which disclaims liability or warranty for this information). If you have questions about a medical condition or this instruction, always ask your healthcare professional. [address] any warranty or liability for your use of this information. Depression After Childbirth: Care Instructions    Your Care Instructions  Many women get the \"baby blues\" during the first few days after childbirth. You may lose sleep, feel irritable, and cry easily. You may feel happy one minute and sad the next. Hormone changes are one cause of these emotional changes. Also, the demands of a new baby, along with visits from relatives or other family needs, add to a mother's stress. The \"baby blues\" often peak around the fourth day. Then they ease up in less than 2 weeks. If your moodiness or anxiety lasts for more than 2 weeks, or if you feel like life is not worth living, you may have postpartum depression. This is different for each mother. Some mothers with serious depression may worry intensely about their infant's well-being. Others may feel distant from their child. Some mothers might even feel that they might harm their baby. A mother may have signs of paranoia, wondering if someone is watching her. Depression is not a sign of weakness.  It is a medical condition that requires treatment. Medicine and counseling often work well to reduce depression. Talk to your doctor about taking antidepressant medicine while breastfeeding. Follow-up care is a key part of your treatment and safety. Be sure to make and go to all appointments, and call your doctor if you are having problems. It's also a good idea to know your test results and keep a list of the medicines you take. How do you know if you are depressed? With all the changes in your life, you may not know if you are depressed. Pregnancy sometimes causes changes in how you feel that are similar to the symptoms of depression. Symptoms of depression include:  · Feeling sad or hopeless and losing interest in daily activities. These are the most common symptoms of depression. · Sleeping too much or not enough. · Feeling tired. You may feel as if you have no energy. · Eating too much or too little. · Writing or talking about death, such as writing suicide notes or talking about guns, knives, or pills. Keep the numbers for these national suicide hotlines: 0-079-330-TALK (3-639.104.5591) and 1-766-ANPCLVQ (9-433.885.2442). If you or someone you know talks about suicide or feeling hopeless, get help right away. How can you care for yourself at home? · Be safe with medicines. Take your medicines exactly as prescribed. Call your doctor if you think you are having a problem with your medicine. · Eat a healthy diet so that you can keep up your energy. · Get regular daily exercise, such as walks, to help improve your mood. · Get as much sunlight as possible. Keep your shades and curtains open. Get outside as much as you can. · Avoid using alcohol or other substances to feel better. · Get as much rest and sleep as possible. Avoid doing too much. Being too tired can increase depression. · Play stimulating music throughout your day and soothing music at night. · Schedule outings and visits with friends and family.  Ask them to call you regularly, so that you do not feel alone. · Ask for help with preparing food and other daily tasks. Family and friends are often happy to help a mother with a . · Be honest with yourself and those who care about you. Tell them about your struggle. · Join a support group of new mothers. No one can better understand the challenges of caring for a  than other new mothers. · If you feel like life is not worth living or are feeling hopeless, get help right away. Keep the numbers for these national suicide hotlines: 1-458-339-TALK (1-322-025-884.518.7141) and 6-294-AUWKTGM (8-224.747.6480). When should you call for help? Call 911 anytime you think you may need emergency care. For example, call if:  · You feel you cannot stop from hurting yourself, your baby, or someone else. Call your doctor now or seek immediate medical care if:  · You are having trouble caring for yourself or your baby. · You hear voices. Watch closely for changes in your health, and be sure to contact your doctor if:  · You have problems with your depression medicine. · You do not get better as expected. Where can you learn more? Go to http://brooklynn-sebastien.info/. Enter U698 in the search box to learn more about \"Depression After Childbirth: Care Instructions. \"  Current as of: 2016  Content Version: 11.2  © 2524-0751 [employer]. Care instructions adapted under license by [employer] (which disclaims liability or warranty for this information). If you have questions about a medical condition or this instruction, always ask your healthcare professional. [place] [address] any warranty or liability for your use of this information.         [employer] Activation    Thank you for requesting access to [address] [employer]. Please follow the instructions below to securely access and download your online medical record.  [employer] allows you to send messages to your doctor, view your test results, renew your prescriptions, schedule appointments, and more. How Do I Sign Up? 1. In your internet browser, go to [email or web address]. [employer]  2. Click on the First Time User? Click Here link in the Sign In box. You will be redirect to the New Member Sign Up page. 3. Enter your Seeqpod Access Code exactly as it appears below. You will not need to use this code after youve completed the sign-up process. If you do not sign up before the expiration date, you must request a new code. Seeqpod Access Code: [email or web address]  Expires: 2017  1:41 PM (This is the date your Seeqpod access code will )    4. Enter the last four digits of your Social Security Number (xxxx) and Date of Birth (mm/dd/yyyy) as indicated and click Submit. You will be taken to the next sign-up page. 5. Create a Seeqpod ID. This will be your Seeqpod login ID and cannot be changed, so think of one that is secure and easy to remember. 6. Create a Seeqpod password. You can change your password at any time. 7. Enter your Password Reset Question and Answer. This can be used at a later time if you forget your password. 8. Enter your e-mail address. You will receive e-mail notification when new information is available in [address] 9. Click Sign Up. You can now view and download portions of your medical record. 10. Click the Download Summary menu link to download a portable copy of your medical information. Additional Information    If you have questions, please visit the Frequently Asked Questions section of the Seeqpod website at [email or web address] Remember, Seeqpod is NOT to be used for urgent needs. For medical emergencies, dial 911. Patient armband removed and shredded.

## 2017-04-17 NOTE — H&P
Progress Note    Patient: Stella Otero  MRN: [id number]  Date: 2017     Age:  32 y.o.,      Sex: female    YOB: 1985    Stella Otero is a 32y.o. year-old female, G 2 P 2 who is 3 days postpartum (). She want to breastfeed. She was admitted through the ED with complaints of lower abdominal pains and with leukocytosis of 15.4. Evaluation in the ED by the ED PA showed tenderness without any rebounds. Review of Systems: General, Cardiovascular, Respiratory, Gastrointestinal, Genitourinary, Neuropsychiatry, Musculoskeletal. Patient denies any problems associated with these systems except for history of postpartum depression/psychosis with her last pregnancy. Patient Vitals for the past 12 hrs:   Temp Pulse Resp BP SpO2   17 1800 99 °F (37.2 °C) 95 16 133/87 98 %   17 1630 - 98 16 139/89 99 %   17 1009 98.7 °F (37.1 °C) (!) 102 18 129/82 99 %     General Examination: She is a well-developed, well-nourished female in no acute distress. HEENT--all within normal limits. Lungs--clear to A&P. Cardiovascular system--normal sinus rhythm, no murmurs or gallop. Abdomen--soft, tender in the lower quads, and normal active. Pelvic exam--External genitalia and BUS--normal.                                  Bimanual Exam--tender and firm uterus.               Adnexa: tenderness    Recent Results (from the past 12 hour(s))   URINALYSIS W/ RFLX MICROSCOPIC    Collection Time: 17 10:20 AM   Result Value Ref Range    Color YELLOW      Appearance CLEAR      Specific gravity 1.015 1.005 - 1.030      pH (UA) 6.5 5.0 - 8.0      Protein NEGATIVE  NEG mg/dL    Glucose NEGATIVE  NEG mg/dL    Ketone NEGATIVE  NEG mg/dL    Bilirubin NEGATIVE  NEG      Blood MODERATE (A) NEG      Urobilinogen 0.2 0.2 - 1.0 EU/dL    Nitrites NEGATIVE  NEG      Leukocyte Esterase SMALL (A) NEG     URINE MICROSCOPIC ONLY    Collection Time: 17 10:20 AM   Result Value Ref Range WBC 0 to 3 0 - 4 /hpf    RBC 4 to 10 0 - 5 /hpf    Epithelial cells FEW 0 - 5 /lpf    Bacteria FEW (A) NEG /hpf   CBC WITH AUTOMATED DIFF    Collection Time: 04/16/17 10:29 AM   Result Value Ref Range    WBC 15.4 (H) 4.6 - 13.2 K/uL    RBC 3.31 (L) 4.20 - 5.30 M/uL    HGB 9.9 (L) 12.0 - 16.0 g/dL    HCT 29.8 (L) 35.0 - 45.0 %    MCV 90.0 74.0 - 97.0 FL    MCH 29.9 24.0 - 34.0 PG    MCHC 33.2 31.0 - 37.0 g/dL    RDW 13.2 11.6 - 14.5 %    PLATELET 629 228 - 742 K/uL    MPV 9.1 (L) 9.2 - 11.8 FL    NEUTROPHILS 83 (H) 40 - 73 %    LYMPHOCYTES 10 (L) 21 - 52 %    MONOCYTES 6 3 - 10 %    EOSINOPHILS 1 0 - 5 %    BASOPHILS 0 0 - 2 %    ABS. NEUTROPHILS 12.8 (H) 1.8 - 8.0 K/UL    ABS. LYMPHOCYTES 1.5 0.9 - 3.6 K/UL    ABS. MONOCYTES 0.9 0.05 - 1.2 K/UL    ABS. EOSINOPHILS 0.2 0.0 - 0.4 K/UL    ABS. BASOPHILS 0.0 0.0 - 0.1 K/UL    DF AUTOMATED     METABOLIC PANEL, COMPREHENSIVE    Collection Time: 04/16/17 10:29 AM   Result Value Ref Range    Sodium 143 136 - 145 mmol/L    Potassium 3.6 3.5 - 5.5 mmol/L    Chloride 107 100 - 108 mmol/L    CO2 23 21 - 32 mmol/L    Anion gap 13 3.0 - 18 mmol/L    Glucose 81 74 - 99 mg/dL    BUN 8 7.0 - 18 MG/DL    Creatinine 0.48 (L) 0.6 - 1.3 MG/DL    BUN/Creatinine ratio 17 12 - 20      GFR est AA >60 >60 ml/min/1.73m2    GFR est non-AA >60 >60 ml/min/1.73m2    Calcium 8.5 8.5 - 10.1 MG/DL    Bilirubin, total 0.4 0.2 - 1.0 MG/DL    ALT (SGPT) 31 13 - 56 U/L    AST (SGOT) 24 15 - 37 U/L    Alk.  phosphatase 127 (H) 45 - 117 U/L    Protein, total 7.0 6.4 - 8.2 g/dL    Albumin 2.8 (L) 3.4 - 5.0 g/dL    Globulin 4.2 (H) 2.0 - 4.0 g/dL    A-G Ratio 0.7 (L) 0.8 - 1.7     TYPE & SCREEN    Collection Time: 04/16/17 10:29 AM   Result Value Ref Range    Crossmatch Expiration 04/19/2017     ABO/Rh(D) A POSITIVE     Antibody screen NEG    WET PREP    Collection Time: 04/16/17 11:30 AM   Result Value Ref Range    Special Requests: NO SPECIAL REQUESTS      Wet prep NO YEAST,TRICHOMONAS OR CLUE CELLS NOTED US--report--essentially benign. Impression. --Mild Endometritis. Plan: ---Continue the antibiotics as ordered. Follow the WBC daily.     Yasir Hopkins MD  April 16, 2017